# Patient Record
Sex: FEMALE | Race: BLACK OR AFRICAN AMERICAN | NOT HISPANIC OR LATINO | Employment: FULL TIME | URBAN - METROPOLITAN AREA
[De-identification: names, ages, dates, MRNs, and addresses within clinical notes are randomized per-mention and may not be internally consistent; named-entity substitution may affect disease eponyms.]

---

## 2024-06-11 ENCOUNTER — APPOINTMENT (OUTPATIENT)
Dept: RADIOLOGY | Facility: HOSPITAL | Age: 39
DRG: 417 | End: 2024-06-11
Payer: MEDICARE

## 2024-06-11 ENCOUNTER — APPOINTMENT (OUTPATIENT)
Dept: CARDIOLOGY | Facility: HOSPITAL | Age: 39
DRG: 417 | End: 2024-06-11
Payer: MEDICARE

## 2024-06-11 ENCOUNTER — HOSPITAL ENCOUNTER (INPATIENT)
Facility: HOSPITAL | Age: 39
LOS: 3 days | Discharge: HOME | End: 2024-06-14
Attending: EMERGENCY MEDICINE | Admitting: INTERNAL MEDICINE
Payer: MEDICARE

## 2024-06-11 DIAGNOSIS — K81.9 CHOLECYSTITIS: ICD-10-CM

## 2024-06-11 DIAGNOSIS — K85.90 ACUTE PANCREATITIS, UNSPECIFIED COMPLICATION STATUS, UNSPECIFIED PANCREATITIS TYPE (HHS-HCC): Primary | ICD-10-CM

## 2024-06-11 LAB
ALBUMIN SERPL BCP-MCNC: 4.7 G/DL (ref 3.4–5)
ALP SERPL-CCNC: 184 U/L (ref 33–110)
ALT SERPL W P-5'-P-CCNC: 74 U/L (ref 7–45)
ANION GAP SERPL CALC-SCNC: 30 MMOL/L (ref 10–20)
APTT PPP: 31 SECONDS (ref 27–38)
AST SERPL W P-5'-P-CCNC: 137 U/L (ref 9–39)
BASOPHILS # BLD AUTO: 0.04 X10*3/UL (ref 0–0.1)
BASOPHILS NFR BLD AUTO: 0.4 %
BILIRUB DIRECT SERPL-MCNC: 0.8 MG/DL (ref 0–0.3)
BILIRUB SERPL-MCNC: 2 MG/DL (ref 0–1.2)
BUN SERPL-MCNC: 10 MG/DL (ref 6–23)
CALCIUM SERPL-MCNC: 10.4 MG/DL (ref 8.6–10.3)
CARDIAC TROPONIN I PNL SERPL HS: 4 NG/L (ref 0–13)
CARDIAC TROPONIN I PNL SERPL HS: 4 NG/L (ref 0–13)
CHLORIDE SERPL-SCNC: 87 MMOL/L (ref 98–107)
CO2 SERPL-SCNC: 22 MMOL/L (ref 21–32)
CREAT SERPL-MCNC: 0.72 MG/DL (ref 0.5–1.05)
EGFRCR SERPLBLD CKD-EPI 2021: >90 ML/MIN/1.73M*2
EOSINOPHIL # BLD AUTO: 0 X10*3/UL (ref 0–0.7)
EOSINOPHIL NFR BLD AUTO: 0 %
ERYTHROCYTE [DISTWIDTH] IN BLOOD BY AUTOMATED COUNT: 13.2 % (ref 11.5–14.5)
GLUCOSE SERPL-MCNC: 116 MG/DL (ref 74–99)
HCT VFR BLD AUTO: 39.8 % (ref 36–46)
HGB BLD-MCNC: 13.6 G/DL (ref 12–16)
IMM GRANULOCYTES # BLD AUTO: 0.04 X10*3/UL (ref 0–0.7)
IMM GRANULOCYTES NFR BLD AUTO: 0.4 % (ref 0–0.9)
INR PPP: 1.1 (ref 0.9–1.1)
LIPASE SERPL-CCNC: 255 U/L (ref 9–82)
LYMPHOCYTES # BLD AUTO: 1.65 X10*3/UL (ref 1.2–4.8)
LYMPHOCYTES NFR BLD AUTO: 16 %
MCH RBC QN AUTO: 29.7 PG (ref 26–34)
MCHC RBC AUTO-ENTMCNC: 34.2 G/DL (ref 32–36)
MCV RBC AUTO: 87 FL (ref 80–100)
MONOCYTES # BLD AUTO: 0.48 X10*3/UL (ref 0.1–1)
MONOCYTES NFR BLD AUTO: 4.7 %
NEUTROPHILS # BLD AUTO: 8.09 X10*3/UL (ref 1.2–7.7)
NEUTROPHILS NFR BLD AUTO: 78.5 %
NRBC BLD-RTO: 0 /100 WBCS (ref 0–0)
PLATELET # BLD AUTO: 266 X10*3/UL (ref 150–450)
POTASSIUM SERPL-SCNC: 3.8 MMOL/L (ref 3.5–5.3)
PROT SERPL-MCNC: 9.3 G/DL (ref 6.4–8.2)
PROTHROMBIN TIME: 12.6 SECONDS (ref 9.8–12.8)
RBC # BLD AUTO: 4.58 X10*6/UL (ref 4–5.2)
SODIUM SERPL-SCNC: 135 MMOL/L (ref 136–145)
WBC # BLD AUTO: 10.3 X10*3/UL (ref 4.4–11.3)

## 2024-06-11 PROCEDURE — 70450 CT HEAD/BRAIN W/O DYE: CPT | Performed by: RADIOLOGY

## 2024-06-11 PROCEDURE — 36415 COLL VENOUS BLD VENIPUNCTURE: CPT | Performed by: EMERGENCY MEDICINE

## 2024-06-11 PROCEDURE — 80053 COMPREHEN METABOLIC PANEL: CPT | Performed by: EMERGENCY MEDICINE

## 2024-06-11 PROCEDURE — 83690 ASSAY OF LIPASE: CPT | Performed by: INTERNAL MEDICINE

## 2024-06-11 PROCEDURE — 99221 1ST HOSP IP/OBS SF/LOW 40: CPT

## 2024-06-11 PROCEDURE — 72125 CT NECK SPINE W/O DYE: CPT | Performed by: RADIOLOGY

## 2024-06-11 PROCEDURE — 71260 CT THORAX DX C+: CPT

## 2024-06-11 PROCEDURE — 96375 TX/PRO/DX INJ NEW DRUG ADDON: CPT

## 2024-06-11 PROCEDURE — 93005 ELECTROCARDIOGRAM TRACING: CPT

## 2024-06-11 PROCEDURE — 96365 THER/PROPH/DIAG IV INF INIT: CPT

## 2024-06-11 PROCEDURE — 2500000004 HC RX 250 GENERAL PHARMACY W/ HCPCS (ALT 636 FOR OP/ED): Performed by: INTERNAL MEDICINE

## 2024-06-11 PROCEDURE — 85610 PROTHROMBIN TIME: CPT | Performed by: EMERGENCY MEDICINE

## 2024-06-11 PROCEDURE — 2550000001 HC RX 255 CONTRASTS: Performed by: EMERGENCY MEDICINE

## 2024-06-11 PROCEDURE — 74177 CT ABD & PELVIS W/CONTRAST: CPT | Mod: FOREIGN READ | Performed by: RADIOLOGY

## 2024-06-11 PROCEDURE — 96376 TX/PRO/DX INJ SAME DRUG ADON: CPT

## 2024-06-11 PROCEDURE — 85025 COMPLETE CBC W/AUTO DIFF WBC: CPT | Performed by: EMERGENCY MEDICINE

## 2024-06-11 PROCEDURE — 84484 ASSAY OF TROPONIN QUANT: CPT | Performed by: EMERGENCY MEDICINE

## 2024-06-11 PROCEDURE — 85730 THROMBOPLASTIN TIME PARTIAL: CPT | Performed by: EMERGENCY MEDICINE

## 2024-06-11 PROCEDURE — 82248 BILIRUBIN DIRECT: CPT | Performed by: SURGERY

## 2024-06-11 PROCEDURE — 2500000004 HC RX 250 GENERAL PHARMACY W/ HCPCS (ALT 636 FOR OP/ED)

## 2024-06-11 PROCEDURE — 70450 CT HEAD/BRAIN W/O DYE: CPT

## 2024-06-11 PROCEDURE — 99222 1ST HOSP IP/OBS MODERATE 55: CPT | Performed by: INTERNAL MEDICINE

## 2024-06-11 PROCEDURE — 72125 CT NECK SPINE W/O DYE: CPT

## 2024-06-11 PROCEDURE — 1210000001 HC SEMI-PRIVATE ROOM DAILY

## 2024-06-11 PROCEDURE — 71260 CT THORAX DX C+: CPT | Mod: FOREIGN READ | Performed by: RADIOLOGY

## 2024-06-11 PROCEDURE — 83690 ASSAY OF LIPASE: CPT | Performed by: EMERGENCY MEDICINE

## 2024-06-11 PROCEDURE — 99285 EMERGENCY DEPT VISIT HI MDM: CPT | Mod: 25

## 2024-06-11 PROCEDURE — 2500000004 HC RX 250 GENERAL PHARMACY W/ HCPCS (ALT 636 FOR OP/ED): Performed by: EMERGENCY MEDICINE

## 2024-06-11 RX ORDER — LABETALOL HYDROCHLORIDE 5 MG/ML
20 INJECTION, SOLUTION INTRAVENOUS ONCE
Status: COMPLETED | OUTPATIENT
Start: 2024-06-11 | End: 2024-06-11

## 2024-06-11 RX ORDER — MORPHINE SULFATE 4 MG/ML
4 INJECTION, SOLUTION INTRAMUSCULAR; INTRAVENOUS EVERY 4 HOURS PRN
Status: DISCONTINUED | OUTPATIENT
Start: 2024-06-11 | End: 2024-06-12

## 2024-06-11 RX ORDER — ACETAMINOPHEN 325 MG/1
650 TABLET ORAL EVERY 4 HOURS PRN
Status: DISCONTINUED | OUTPATIENT
Start: 2024-06-11 | End: 2024-06-14 | Stop reason: HOSPADM

## 2024-06-11 RX ORDER — ONDANSETRON HYDROCHLORIDE 2 MG/ML
4 INJECTION, SOLUTION INTRAVENOUS ONCE
Status: COMPLETED | OUTPATIENT
Start: 2024-06-11 | End: 2024-06-11

## 2024-06-11 RX ORDER — ONDANSETRON 4 MG/1
4 TABLET, FILM COATED ORAL EVERY 8 HOURS PRN
Status: DISCONTINUED | OUTPATIENT
Start: 2024-06-11 | End: 2024-06-12

## 2024-06-11 RX ORDER — MORPHINE SULFATE 2 MG/ML
2 INJECTION, SOLUTION INTRAMUSCULAR; INTRAVENOUS EVERY 4 HOURS PRN
Status: DISCONTINUED | OUTPATIENT
Start: 2024-06-11 | End: 2024-06-12

## 2024-06-11 RX ORDER — ENOXAPARIN SODIUM 100 MG/ML
40 INJECTION SUBCUTANEOUS EVERY 24 HOURS
Status: DISCONTINUED | OUTPATIENT
Start: 2024-06-12 | End: 2024-06-14 | Stop reason: HOSPADM

## 2024-06-11 RX ORDER — ACETAMINOPHEN 160 MG/5ML
650 SOLUTION ORAL EVERY 4 HOURS PRN
Status: DISCONTINUED | OUTPATIENT
Start: 2024-06-11 | End: 2024-06-14 | Stop reason: HOSPADM

## 2024-06-11 RX ORDER — MORPHINE SULFATE 4 MG/ML
4 INJECTION, SOLUTION INTRAMUSCULAR; INTRAVENOUS ONCE
Status: COMPLETED | OUTPATIENT
Start: 2024-06-11 | End: 2024-06-11

## 2024-06-11 RX ORDER — PANTOPRAZOLE SODIUM 40 MG/10ML
40 INJECTION, POWDER, LYOPHILIZED, FOR SOLUTION INTRAVENOUS
Status: DISCONTINUED | OUTPATIENT
Start: 2024-06-12 | End: 2024-06-14 | Stop reason: HOSPADM

## 2024-06-11 RX ORDER — PANTOPRAZOLE SODIUM 40 MG/1
40 TABLET, DELAYED RELEASE ORAL
Status: DISCONTINUED | OUTPATIENT
Start: 2024-06-12 | End: 2024-06-14 | Stop reason: HOSPADM

## 2024-06-11 RX ORDER — ONDANSETRON HYDROCHLORIDE 2 MG/ML
4 INJECTION, SOLUTION INTRAVENOUS EVERY 8 HOURS PRN
Status: DISCONTINUED | OUTPATIENT
Start: 2024-06-11 | End: 2024-06-14 | Stop reason: HOSPADM

## 2024-06-11 RX ORDER — ACETAMINOPHEN 650 MG/1
650 SUPPOSITORY RECTAL EVERY 4 HOURS PRN
Status: DISCONTINUED | OUTPATIENT
Start: 2024-06-11 | End: 2024-06-12

## 2024-06-11 RX ADMIN — ONDANSETRON 4 MG: 2 INJECTION INTRAMUSCULAR; INTRAVENOUS at 21:44

## 2024-06-11 RX ADMIN — LABETALOL HYDROCHLORIDE 20 MG: 5 INJECTION, SOLUTION INTRAVENOUS at 22:43

## 2024-06-11 RX ADMIN — PIPERACILLIN SODIUM AND TAZOBACTAM SODIUM 3.38 G: 3; .375 INJECTION, SOLUTION INTRAVENOUS at 21:24

## 2024-06-11 RX ADMIN — IOHEXOL 75 ML: 350 INJECTION, SOLUTION INTRAVENOUS at 17:21

## 2024-06-11 RX ADMIN — MORPHINE SULFATE 4 MG: 4 INJECTION, SOLUTION INTRAMUSCULAR; INTRAVENOUS at 14:49

## 2024-06-11 RX ADMIN — ONDANSETRON 4 MG: 2 INJECTION INTRAMUSCULAR; INTRAVENOUS at 14:50

## 2024-06-11 RX ADMIN — SODIUM CHLORIDE 1000 ML: 9 INJECTION, SOLUTION INTRAVENOUS at 21:23

## 2024-06-11 RX ADMIN — MORPHINE SULFATE 4 MG: 4 INJECTION, SOLUTION INTRAMUSCULAR; INTRAVENOUS at 21:24

## 2024-06-11 ASSESSMENT — PAIN DESCRIPTION - ORIENTATION: ORIENTATION: RIGHT

## 2024-06-11 ASSESSMENT — ENCOUNTER SYMPTOMS
ABDOMINAL PAIN: 1
APPETITE CHANGE: 1
BACK PAIN: 1
SHORTNESS OF BREATH: 1
NAUSEA: 1
VOMITING: 1

## 2024-06-11 ASSESSMENT — PAIN DESCRIPTION - LOCATION
LOCATION: BACK
LOCATION: BACK

## 2024-06-11 ASSESSMENT — PAIN - FUNCTIONAL ASSESSMENT
PAIN_FUNCTIONAL_ASSESSMENT: 0-10

## 2024-06-11 ASSESSMENT — COLUMBIA-SUICIDE SEVERITY RATING SCALE - C-SSRS
6. HAVE YOU EVER DONE ANYTHING, STARTED TO DO ANYTHING, OR PREPARED TO DO ANYTHING TO END YOUR LIFE?: NO
2. HAVE YOU ACTUALLY HAD ANY THOUGHTS OF KILLING YOURSELF?: NO
1. IN THE PAST MONTH, HAVE YOU WISHED YOU WERE DEAD OR WISHED YOU COULD GO TO SLEEP AND NOT WAKE UP?: NO

## 2024-06-11 ASSESSMENT — PAIN SCALES - GENERAL
PAINLEVEL_OUTOF10: 5 - MODERATE PAIN
PAINLEVEL_OUTOF10: 10 - WORST POSSIBLE PAIN
PAINLEVEL_OUTOF10: 10 - WORST POSSIBLE PAIN
PAINLEVEL_OUTOF10: 9

## 2024-06-11 ASSESSMENT — PAIN DESCRIPTION - PAIN TYPE: TYPE: ACUTE PAIN
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
## 2024-06-12 ENCOUNTER — ANESTHESIA (OUTPATIENT)
Dept: OPERATING ROOM | Facility: HOSPITAL | Age: 39
End: 2024-06-12
Payer: MEDICARE

## 2024-06-12 ENCOUNTER — ANESTHESIA EVENT (OUTPATIENT)
Dept: OPERATING ROOM | Facility: HOSPITAL | Age: 39
End: 2024-06-12
Payer: MEDICARE

## 2024-06-12 ENCOUNTER — APPOINTMENT (OUTPATIENT)
Dept: RADIOLOGY | Facility: HOSPITAL | Age: 39
DRG: 417 | End: 2024-06-12
Payer: MEDICARE

## 2024-06-12 PROBLEM — J42 CHRONIC BRONCHITIS (MULTI): Status: ACTIVE | Noted: 2024-06-12

## 2024-06-12 PROBLEM — K81.9 CHOLECYSTITIS: Status: ACTIVE | Noted: 2024-06-11

## 2024-06-12 PROBLEM — I10 HTN (HYPERTENSION): Status: ACTIVE | Noted: 2024-06-12

## 2024-06-12 PROBLEM — K76.0 FATTY LIVER: Status: ACTIVE | Noted: 2024-06-12

## 2024-06-12 LAB
ALBUMIN SERPL BCP-MCNC: 4 G/DL (ref 3.4–5)
ALP SERPL-CCNC: 148 U/L (ref 33–110)
ALT SERPL W P-5'-P-CCNC: 53 U/L (ref 7–45)
ANION GAP SERPL CALC-SCNC: 19 MMOL/L (ref 10–20)
AST SERPL W P-5'-P-CCNC: 96 U/L (ref 9–39)
B-HCG SERPL-ACNC: <2 MIU/ML
BILIRUB DIRECT SERPL-MCNC: 0.7 MG/DL (ref 0–0.3)
BILIRUB SERPL-MCNC: 1.5 MG/DL (ref 0–1.2)
BUN SERPL-MCNC: 8 MG/DL (ref 6–23)
CALCIUM SERPL-MCNC: 9 MG/DL (ref 8.6–10.3)
CHLORIDE SERPL-SCNC: 89 MMOL/L (ref 98–107)
CO2 SERPL-SCNC: 28 MMOL/L (ref 21–32)
CREAT SERPL-MCNC: 0.62 MG/DL (ref 0.5–1.05)
EGFRCR SERPLBLD CKD-EPI 2021: >90 ML/MIN/1.73M*2
ERYTHROCYTE [DISTWIDTH] IN BLOOD BY AUTOMATED COUNT: 13.2 % (ref 11.5–14.5)
GLUCOSE SERPL-MCNC: 103 MG/DL (ref 74–99)
HCT VFR BLD AUTO: 33.3 % (ref 36–46)
HGB BLD-MCNC: 11.2 G/DL (ref 12–16)
LIPASE SERPL-CCNC: 180 U/L (ref 9–82)
MCH RBC QN AUTO: 29 PG (ref 26–34)
MCHC RBC AUTO-ENTMCNC: 33.6 G/DL (ref 32–36)
MCV RBC AUTO: 86 FL (ref 80–100)
NRBC BLD-RTO: 0 /100 WBCS (ref 0–0)
PLATELET # BLD AUTO: 207 X10*3/UL (ref 150–450)
POTASSIUM SERPL-SCNC: 2.8 MMOL/L (ref 3.5–5.3)
PROT SERPL-MCNC: 7.4 G/DL (ref 6.4–8.2)
RBC # BLD AUTO: 3.86 X10*6/UL (ref 4–5.2)
SODIUM SERPL-SCNC: 133 MMOL/L (ref 136–145)
WBC # BLD AUTO: 7.5 X10*3/UL (ref 4.4–11.3)

## 2024-06-12 PROCEDURE — 3700000001 HC GENERAL ANESTHESIA TIME - INITIAL BASE CHARGE: Performed by: SURGERY

## 2024-06-12 PROCEDURE — 2500000004 HC RX 250 GENERAL PHARMACY W/ HCPCS (ALT 636 FOR OP/ED): Performed by: INTERNAL MEDICINE

## 2024-06-12 PROCEDURE — 99233 SBSQ HOSP IP/OBS HIGH 50: CPT | Performed by: INTERNAL MEDICINE

## 2024-06-12 PROCEDURE — 2720000007 HC OR 272 NO HCPCS: Performed by: SURGERY

## 2024-06-12 PROCEDURE — BF532Z0 OTHER IMAGING OF GALLBLADDER AND BILE DUCTS USING FLUORESCING AGENT, INTRAOPERATIVE: ICD-10-PCS | Performed by: SURGERY

## 2024-06-12 PROCEDURE — 2500000004 HC RX 250 GENERAL PHARMACY W/ HCPCS (ALT 636 FOR OP/ED): Performed by: REGISTERED NURSE

## 2024-06-12 PROCEDURE — 99231 SBSQ HOSP IP/OBS SF/LOW 25: CPT

## 2024-06-12 PROCEDURE — 7100000001 HC RECOVERY ROOM TIME - INITIAL BASE CHARGE: Performed by: SURGERY

## 2024-06-12 PROCEDURE — 2500000004 HC RX 250 GENERAL PHARMACY W/ HCPCS (ALT 636 FOR OP/ED): Performed by: SURGERY

## 2024-06-12 PROCEDURE — 88304 TISSUE EXAM BY PATHOLOGIST: CPT | Performed by: STUDENT IN AN ORGANIZED HEALTH CARE EDUCATION/TRAINING PROGRAM

## 2024-06-12 PROCEDURE — 3700000002 HC GENERAL ANESTHESIA TIME - EACH INCREMENTAL 1 MINUTE: Performed by: SURGERY

## 2024-06-12 PROCEDURE — 2500000005 HC RX 250 GENERAL PHARMACY W/O HCPCS: Performed by: REGISTERED NURSE

## 2024-06-12 PROCEDURE — 80053 COMPREHEN METABOLIC PANEL: CPT | Performed by: INTERNAL MEDICINE

## 2024-06-12 PROCEDURE — 99222 1ST HOSP IP/OBS MODERATE 55: CPT | Performed by: INTERNAL MEDICINE

## 2024-06-12 PROCEDURE — 74300 X-RAY BILE DUCTS/PANCREAS: CPT

## 2024-06-12 PROCEDURE — 84702 CHORIONIC GONADOTROPIN TEST: CPT | Performed by: SURGERY

## 2024-06-12 PROCEDURE — 88304 TISSUE EXAM BY PATHOLOGIST: CPT | Mod: TC,AHULAB | Performed by: SURGERY

## 2024-06-12 PROCEDURE — 2500000004 HC RX 250 GENERAL PHARMACY W/ HCPCS (ALT 636 FOR OP/ED): Performed by: STUDENT IN AN ORGANIZED HEALTH CARE EDUCATION/TRAINING PROGRAM

## 2024-06-12 PROCEDURE — 99232 SBSQ HOSP IP/OBS MODERATE 35: CPT | Performed by: SURGERY

## 2024-06-12 PROCEDURE — 76705 ECHO EXAM OF ABDOMEN: CPT

## 2024-06-12 PROCEDURE — 2780000003 HC OR 278 NO HCPCS: Performed by: SURGERY

## 2024-06-12 PROCEDURE — 7100000002 HC RECOVERY ROOM TIME - EACH INCREMENTAL 1 MINUTE: Performed by: SURGERY

## 2024-06-12 PROCEDURE — 3600000003 HC OR TIME - INITIAL BASE CHARGE - PROCEDURE LEVEL THREE: Performed by: SURGERY

## 2024-06-12 PROCEDURE — 47563 LAPARO CHOLECYSTECTOMY/GRAPH: CPT | Performed by: SURGERY

## 2024-06-12 PROCEDURE — 76705 ECHO EXAM OF ABDOMEN: CPT | Performed by: RADIOLOGY

## 2024-06-12 PROCEDURE — 0FT44ZZ RESECTION OF GALLBLADDER, PERCUTANEOUS ENDOSCOPIC APPROACH: ICD-10-PCS | Performed by: SURGERY

## 2024-06-12 PROCEDURE — 84075 ASSAY ALKALINE PHOSPHATASE: CPT | Performed by: NURSE PRACTITIONER

## 2024-06-12 PROCEDURE — 1100000001 HC PRIVATE ROOM DAILY

## 2024-06-12 PROCEDURE — 36415 COLL VENOUS BLD VENIPUNCTURE: CPT | Performed by: INTERNAL MEDICINE

## 2024-06-12 PROCEDURE — 85027 COMPLETE CBC AUTOMATED: CPT | Performed by: INTERNAL MEDICINE

## 2024-06-12 PROCEDURE — 3600000008 HC OR TIME - EACH INCREMENTAL 1 MINUTE - PROCEDURE LEVEL THREE: Performed by: SURGERY

## 2024-06-12 RX ORDER — OXYCODONE HYDROCHLORIDE 5 MG/1
5 TABLET ORAL EVERY 4 HOURS PRN
Status: DISCONTINUED | OUTPATIENT
Start: 2024-06-12 | End: 2024-06-12 | Stop reason: HOSPADM

## 2024-06-12 RX ORDER — MIDAZOLAM HYDROCHLORIDE 1 MG/ML
INJECTION INTRAMUSCULAR; INTRAVENOUS AS NEEDED
Status: DISCONTINUED | OUTPATIENT
Start: 2024-06-12 | End: 2024-06-12

## 2024-06-12 RX ORDER — DEXMEDETOMIDINE IN 0.9 % NACL 20 MCG/5ML
SYRINGE (ML) INTRAVENOUS AS NEEDED
Status: DISCONTINUED | OUTPATIENT
Start: 2024-06-12 | End: 2024-06-12

## 2024-06-12 RX ORDER — ONDANSETRON HYDROCHLORIDE 2 MG/ML
4 INJECTION, SOLUTION INTRAVENOUS ONCE AS NEEDED
Status: DISCONTINUED | OUTPATIENT
Start: 2024-06-12 | End: 2024-06-12 | Stop reason: HOSPADM

## 2024-06-12 RX ORDER — FENTANYL CITRATE 50 UG/ML
INJECTION, SOLUTION INTRAMUSCULAR; INTRAVENOUS AS NEEDED
Status: DISCONTINUED | OUTPATIENT
Start: 2024-06-12 | End: 2024-06-12

## 2024-06-12 RX ORDER — DIPHENHYDRAMINE HYDROCHLORIDE 50 MG/ML
12.5 INJECTION INTRAMUSCULAR; INTRAVENOUS ONCE AS NEEDED
Status: DISCONTINUED | OUTPATIENT
Start: 2024-06-12 | End: 2024-06-12 | Stop reason: HOSPADM

## 2024-06-12 RX ORDER — ONDANSETRON HYDROCHLORIDE 2 MG/ML
INJECTION, SOLUTION INTRAVENOUS AS NEEDED
Status: DISCONTINUED | OUTPATIENT
Start: 2024-06-12 | End: 2024-06-12

## 2024-06-12 RX ORDER — CEFAZOLIN 1 G/1
INJECTION, POWDER, FOR SOLUTION INTRAVENOUS AS NEEDED
Status: DISCONTINUED | OUTPATIENT
Start: 2024-06-12 | End: 2024-06-12

## 2024-06-12 RX ORDER — KETOROLAC TROMETHAMINE 30 MG/ML
15 INJECTION, SOLUTION INTRAMUSCULAR; INTRAVENOUS EVERY 6 HOURS
Status: DISPENSED | OUTPATIENT
Start: 2024-06-12 | End: 2024-06-14

## 2024-06-12 RX ORDER — LIDOCAINE HYDROCHLORIDE 10 MG/ML
0.1 INJECTION, SOLUTION EPIDURAL; INFILTRATION; INTRACAUDAL; PERINEURAL ONCE
Status: DISCONTINUED | OUTPATIENT
Start: 2024-06-12 | End: 2024-06-12 | Stop reason: HOSPADM

## 2024-06-12 RX ORDER — SODIUM CHLORIDE, SODIUM LACTATE, POTASSIUM CHLORIDE, CALCIUM CHLORIDE 600; 310; 30; 20 MG/100ML; MG/100ML; MG/100ML; MG/100ML
INJECTION, SOLUTION INTRAVENOUS CONTINUOUS PRN
Status: DISCONTINUED | OUTPATIENT
Start: 2024-06-12 | End: 2024-06-12

## 2024-06-12 RX ORDER — PROPOFOL 10 MG/ML
INJECTION, EMULSION INTRAVENOUS AS NEEDED
Status: DISCONTINUED | OUTPATIENT
Start: 2024-06-12 | End: 2024-06-12

## 2024-06-12 RX ORDER — LIDOCAINE HYDROCHLORIDE 20 MG/ML
INJECTION, SOLUTION EPIDURAL; INFILTRATION; INTRACAUDAL; PERINEURAL AS NEEDED
Status: DISCONTINUED | OUTPATIENT
Start: 2024-06-12 | End: 2024-06-12

## 2024-06-12 RX ORDER — POLYETHYLENE GLYCOL 3350 17 G/17G
17 POWDER, FOR SOLUTION ORAL DAILY
Status: DISCONTINUED | OUTPATIENT
Start: 2024-06-12 | End: 2024-06-14 | Stop reason: HOSPADM

## 2024-06-12 RX ORDER — LABETALOL HYDROCHLORIDE 5 MG/ML
5 INJECTION, SOLUTION INTRAVENOUS ONCE AS NEEDED
Status: DISCONTINUED | OUTPATIENT
Start: 2024-06-12 | End: 2024-06-12 | Stop reason: HOSPADM

## 2024-06-12 RX ORDER — BUPIVACAINE HYDROCHLORIDE 5 MG/ML
INJECTION, SOLUTION PERINEURAL AS NEEDED
Status: DISCONTINUED | OUTPATIENT
Start: 2024-06-12 | End: 2024-06-12 | Stop reason: HOSPADM

## 2024-06-12 RX ORDER — POTASSIUM CHLORIDE 14.9 MG/ML
20 INJECTION INTRAVENOUS
Status: COMPLETED | OUTPATIENT
Start: 2024-06-12 | End: 2024-06-12

## 2024-06-12 RX ORDER — KETOROLAC TROMETHAMINE 30 MG/ML
INJECTION, SOLUTION INTRAMUSCULAR; INTRAVENOUS AS NEEDED
Status: DISCONTINUED | OUTPATIENT
Start: 2024-06-12 | End: 2024-06-12

## 2024-06-12 RX ORDER — OXYCODONE AND ACETAMINOPHEN 5; 325 MG/1; MG/1
1 TABLET ORAL EVERY 4 HOURS PRN
Status: DISCONTINUED | OUTPATIENT
Start: 2024-06-12 | End: 2024-06-14 | Stop reason: HOSPADM

## 2024-06-12 RX ORDER — ROSUVASTATIN CALCIUM 20 MG/1
20 TABLET, COATED ORAL
COMMUNITY
Start: 2024-04-05

## 2024-06-12 RX ORDER — SODIUM CHLORIDE, SODIUM LACTATE, POTASSIUM CHLORIDE, CALCIUM CHLORIDE 600; 310; 30; 20 MG/100ML; MG/100ML; MG/100ML; MG/100ML
100 INJECTION, SOLUTION INTRAVENOUS CONTINUOUS
Status: DISCONTINUED | OUTPATIENT
Start: 2024-06-12 | End: 2024-06-12 | Stop reason: HOSPADM

## 2024-06-12 RX ORDER — HYDROMORPHONE HYDROCHLORIDE 0.2 MG/ML
0.2 INJECTION INTRAMUSCULAR; INTRAVENOUS; SUBCUTANEOUS EVERY 4 HOURS PRN
Status: DISCONTINUED | OUTPATIENT
Start: 2024-06-12 | End: 2024-06-12

## 2024-06-12 RX ORDER — METOPROLOL TARTRATE 1 MG/ML
INJECTION, SOLUTION INTRAVENOUS AS NEEDED
Status: DISCONTINUED | OUTPATIENT
Start: 2024-06-12 | End: 2024-06-12

## 2024-06-12 RX ORDER — ROCURONIUM BROMIDE 10 MG/ML
INJECTION, SOLUTION INTRAVENOUS AS NEEDED
Status: DISCONTINUED | OUTPATIENT
Start: 2024-06-12 | End: 2024-06-12

## 2024-06-12 RX ORDER — ESMOLOL HYDROCHLORIDE 10 MG/ML
INJECTION INTRAVENOUS AS NEEDED
Status: DISCONTINUED | OUTPATIENT
Start: 2024-06-12 | End: 2024-06-12

## 2024-06-12 RX ADMIN — POTASSIUM CHLORIDE 20 MEQ: 14.9 INJECTION, SOLUTION INTRAVENOUS at 10:17

## 2024-06-12 RX ADMIN — HYDROMORPHONE HYDROCHLORIDE 0.5 MG: 1 INJECTION, SOLUTION INTRAMUSCULAR; INTRAVENOUS; SUBCUTANEOUS at 17:49

## 2024-06-12 RX ADMIN — HYDROMORPHONE HYDROCHLORIDE 0.5 MG: 1 INJECTION, SOLUTION INTRAMUSCULAR; INTRAVENOUS; SUBCUTANEOUS at 18:08

## 2024-06-12 RX ADMIN — HYDROMORPHONE HYDROCHLORIDE 0.4 MG: 1 INJECTION, SOLUTION INTRAMUSCULAR; INTRAVENOUS; SUBCUTANEOUS at 08:04

## 2024-06-12 RX ADMIN — HYDROMORPHONE HYDROCHLORIDE 0.5 MG: 1 INJECTION, SOLUTION INTRAMUSCULAR; INTRAVENOUS; SUBCUTANEOUS at 17:59

## 2024-06-12 RX ADMIN — HYDROMORPHONE HYDROCHLORIDE 0.5 MG: 1 INJECTION, SOLUTION INTRAMUSCULAR; INTRAVENOUS; SUBCUTANEOUS at 17:38

## 2024-06-12 RX ADMIN — ONDANSETRON 4 MG: 2 INJECTION INTRAMUSCULAR; INTRAVENOUS at 08:14

## 2024-06-12 RX ADMIN — ONDANSETRON 4 MG: 2 INJECTION INTRAMUSCULAR; INTRAVENOUS at 20:26

## 2024-06-12 RX ADMIN — HYDROMORPHONE HYDROCHLORIDE 0.4 MG: 1 INJECTION, SOLUTION INTRAMUSCULAR; INTRAVENOUS; SUBCUTANEOUS at 12:27

## 2024-06-12 RX ADMIN — HYDROMORPHONE HYDROCHLORIDE 0.4 MG: 1 INJECTION, SOLUTION INTRAMUSCULAR; INTRAVENOUS; SUBCUTANEOUS at 21:02

## 2024-06-12 RX ADMIN — ENOXAPARIN SODIUM 40 MG: 40 INJECTION SUBCUTANEOUS at 08:03

## 2024-06-12 RX ADMIN — POTASSIUM CHLORIDE 20 MEQ: 14.9 INJECTION, SOLUTION INTRAVENOUS at 11:15

## 2024-06-12 SDOH — HEALTH STABILITY: MENTAL HEALTH: CURRENT SMOKER: 0

## 2024-06-12 ASSESSMENT — PAIN SCALES - GENERAL
PAINLEVEL_OUTOF10: 6
PAINLEVEL_OUTOF10: 6
PAINLEVEL_OUTOF10: 10 - WORST POSSIBLE PAIN
PAINLEVEL_OUTOF10: 9
PAINLEVEL_OUTOF10: 5 - MODERATE PAIN
PAINLEVEL_OUTOF10: 9
PAINLEVEL_OUTOF10: 10 - WORST POSSIBLE PAIN
PAINLEVEL_OUTOF10: 3
PAINLEVEL_OUTOF10: 3
PAINLEVEL_OUTOF10: 4
PAINLEVEL_OUTOF10: 7
PAINLEVEL_OUTOF10: 4

## 2024-06-12 ASSESSMENT — COGNITIVE AND FUNCTIONAL STATUS - GENERAL
DAILY ACTIVITIY SCORE: 24
DAILY ACTIVITIY SCORE: 24
MOBILITY SCORE: 24
DAILY ACTIVITIY SCORE: 24
MOBILITY SCORE: 24
MOBILITY SCORE: 24

## 2024-06-12 ASSESSMENT — PAIN DESCRIPTION - LOCATION
LOCATION: ABDOMEN
LOCATION: ABDOMEN

## 2024-06-12 ASSESSMENT — ACTIVITIES OF DAILY LIVING (ADL): LACK_OF_TRANSPORTATION: NO

## 2024-06-12 ASSESSMENT — PAIN DESCRIPTION - ORIENTATION: ORIENTATION: RIGHT

## 2024-06-12 ASSESSMENT — PAIN SCALES - PAIN ASSESSMENT IN ADVANCED DEMENTIA (PAINAD): TOTALSCORE: MEDICATION (SEE MAR);HEAT APPLIED;COLD APPLIED

## 2024-06-12 ASSESSMENT — PAIN SCALES - WONG BAKER: WONGBAKER_NUMERICALRESPONSE: HURTS WORST

## 2024-06-12 NOTE — CARE PLAN
Problem: Pain  Goal: Takes deep breaths with improved pain control throughout the shift  Outcome: Progressing  Goal: Performs ADL's with improved pain control throughout shift  Outcome: Progressing  Goal: Participates in PT with improved pain control throughout the shift  Outcome: Progressing  Goal: Free from opioid side effects throughout the shift  Outcome: Progressing
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
## 2024-06-12 NOTE — OP NOTE
Cholecystectomy Laparoscopy Operative Note     Date: 2024 - 2024  OR Location: [facility] A OR    Name: Deloris Mcfadden, : 1985, Age: 39 y.o., MRN: [id number], Sex: female    Diagnosis  Pre-op Diagnosis     * Cholecystitis [K81.9] Post-op Diagnosis     * Cholecystitis [K81.9].  Pancreatitis     Procedures  Laparoscopic cholecystectomy and intraoperative cholangiogram    Surgeons      * Reginald Harden - Primary    Resident/Fellow/Other Assistant:  Surgeons and Role:  * No surgeons found with a matching role *    Procedure Summary  Anesthesia: General  ASA: III  Anesthesia Staff: Anesthesiologist: Martin Cedillo MD  CRNA: MARIAH Farias-CRNA, DNAP  C-AA: ISABEL Lino  Estimated Blood Loss: 5mL  Intra-op Medications:   Administrations occurring from 1500 to 1610 on 24:   Medication Name Total Dose   enoxaparin (Lovenox) syringe 40 mg Cannot be calculated   HYDROmorphone (Dilaudid) injection 0.4 mg Cannot be calculated   HYDROmorphone PF (Dilaudid) injection 0.2 mg Cannot be calculated   morphine injection 2 mg Cannot be calculated   morphine injection 4 mg Cannot be calculated              Anesthesia Record               Intraprocedure I/O Totals          Intake    LR infusion 1000.00 mL    Total Intake 1000 mL       Output    Urine 0 mL    Est. Blood Loss 10 mL    Total Output 10 mL       Net    Net Volume 990 mL          Specimen:   ID Type Source Tests Collected by Time   1 : GALLBLADDER Tissue GALLBLADDER CHOLECYSTECTOMY SURGICAL PATHOLOGY EXAM Reginald Harden MD 2024 1610        Staff:   Circulator: Elizabet Blood Person: Ramin Moeub Person: Delisa         Drains and/or Catheters:   [REMOVED] NG/OG/Feeding Tube OG - [place] sump 16 Fr Left mouth (Removed)   Tube Status Low intermittent suction 24 1555   Placement Verification Gastric contents 24 1555   Gastric Aspirate Other (Comment) 24 155   Site Assessment Clean;Dry;Intact 24    Drainage Appearance Bile 06/12/24 1555       Tourniquet Times:         Implants:     Findings: Cholangiogram appeared normal.  Massively distended gallbladder under some tension    Indications: Deloris Mcfadden is an 39 y.o. female who is having surgery for Cholecystitis [K81.9].  Diagnosed with likely biliary pancreatitis..    The patient was seen in the preoperative area. The risks, benefits, complications, treatment options, non-operative alternatives, expected recovery and outcomes were discussed with the patient. The possibilities of reaction to medication, pulmonary aspiration, injury to surrounding structures, bleeding, recurrent infection, the need for additional procedures, failure to diagnose a condition, and creating a complication requiring transfusion or operation were discussed with the patient. The patient concurred with the proposed plan, giving informed consent.  The site of surgery was properly noted/marked if necessary per policy. The patient has been actively warmed in preoperative area. Preoperative antibiotics have been ordered and given within 1 hours of incision. Venous thrombosis prophylaxis have been ordered including bilateral sequential compression devices    Procedure Details:  Description:  Patient was brought to the operating room placed supine on the table.  Timeout was performed which confirmed patient and procedure.  Perioperative antibiotics were administered.  Gen. anesthesia was administered through an endotracheal tube.  The abdomen was prepped and draped sterilely.    I injected half percent Marcaine and then made a sharp infraumbilical incision with the knife.  Sharp incision was made in the fascia.  The peritoneal cavity was entered under direct visualization and a Joleen port was placed.  The abdomen was insufflated and the patient was placed in steep reverse Trendelenburg.  A 5 mm 30° camera was introduced.  I placed a right upper quadrant 5 mm port and another 5 mL port  in the midline subxiphoid area.  The gallbladder was visualized.  It was noted to be massively distended and very low-lying in the abdomen.  It was then grasped and retracted superiorly into the right.  Meticulous dissection was then performed in the area of Calot triangle.  Critical view was achieved.  Posterior cystic plate visualized.  The cystic artery and cystic duct were skeletonized.  The artery was divided between hemoclips.  Endo Clip placed on the gallbladder side and then made a ductotomy with EndoShears.  Ranfac cholangiocatheter was inserted through a separate angiocatheter sheath.  Cholangiogram was obtained using C-arm fluoroscopy.  The anatomy was noted to be normal.  No obvious filling defects seen. Ranfac catheter removed.  I placed 3 clips on the distal cystic duct and one toward the gallbladder and divided with EndoShears.  The gallbladder was then dissected atraumatically out of the liver bed with hook cautery.  Once it was liberated it was placed in the Endo Catch bag and brought out through the umbilicus.  Liver bed was inspected and noted to be hemostatic.  Clips were noted to be in good position.  Gentle irrigation was performed.  The effluent was noted to be clear.  The ports were removed under direct visualization.  The abdomen was desufflated.  The fascia at the umbilicus was closed with 0 Vicryl.  Wounds were irrigated.  Skin incisions were closed with 4-0 Biosyn and Dermabond.  Patient was ultimately extubated and transferred to the recovery room in satisfactory condition.      Complications:  None; patient tolerated the procedure well.    Disposition: PACU - hemodynamically stable.  Condition: stable         Additional Details:     Attending Attestation: I was present for the entire procedure.    Reginald Harden  Phone Number: [phone]

## 2024-06-12 NOTE — NURSING NOTE
Upon arrival at the patients bedside the patient was ambulating off of the gurney with a steady gait, neurologically intact, denies pain, tolerated PO intake, 1 bout of emesis, tenderness at the abdomen, last bm 6/10, no noted distress rounding ensued care transferred without incidence. No sob, no hematuria no hematochezia care transferred withy the patient requesting breakfast.

## 2024-06-12 NOTE — PROGRESS NOTES
Pharmacy Medication History Review    Delrois Mcfadden is a 39 y.o. female admitted for Acute pancreatitis, unspecified complication status, unspecified pancreatitis type ([facility] Pharmacy reviewed the patient's [id number] medications and allergies for accuracy.    The list below reflectives the updated PTA list. Please review each medication in order reconciliation for additional clarification and justification.  Prior to Admission Medications   Prescriptions Last Dose Informant     rosuvastatin (Crestor) 20 mg tablet 6/10/2024      Sig: Take 1 tablet (20 mg) by mouth once daily.      Facility-Administered Medications: None      Allergies  Reviewed by Altagracia Hayes RN on 6/12/2024   No Known Allergies         Below are additional concerns with the patient's PTA list.  Patient verified all medications and doses.    Leena Saldaña
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
## 2024-06-12 NOTE — ANESTHESIA PREPROCEDURE EVALUATION
Patient: Deloris Mcfadden    Procedure Information       Date/Time: 06/12/24 1500    Procedure: Cholecystectomy Laparoscopy    [facility]    Surgeons: Reginald Harden MD            Relevant Problems   Anesthesia (within normal limits)      Cardiac   (+) HTN (hypertension) (On no meds)      Pulmonary   (+) Chronic bronchitis (Multi)      Liver   (+) Acute pancreatitis, unspecified complication status, unspecified pancreatitis type (HHS-HCC)   (+) Cholecystitis   (+) Fatty liver       Clinical information reviewed:   Tobacco  Allergies  Meds  Problems  Med Hx  Surg Hx   Fam Hx  Soc   Hx        NPO Detail:  NPO/Void Status  Date of Last Liquid: 06/11/24  Date of Last Solid: 06/11/24         Physical Exam    Airway  Mallampati: I  TM distance: >3 FB  Neck ROM: full     Cardiovascular    Dental    Pulmonary    Abdominal            Anesthesia Plan    History of general anesthesia?: yes  History of complications of general anesthesia?: no    ASA 3 - emergent     general     The patient is not a current smoker.    intravenous induction   Anesthetic plan and risks discussed with patient.    Plan discussed with CRNA.
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
## 2024-06-12 NOTE — ANESTHESIA PROCEDURE NOTES
Airway  Date/Time: 6/12/2024 3:52 PM  Urgency: elective    Airway not difficult    Staffing  Performed: CRNA   Authorized by: Martin Cedillo MD    Performed by: MARIAH Farias-CRNA, DNAP  Patient location during procedure: OR    Indications and Patient Condition  Indications for airway management: anesthesia  Spontaneous ventilation: present  Sedation level: deep  Preoxygenated: yes  Patient position: sniffing  Mask difficulty assessment: 1 - vent by mask    Final Airway Details  Final airway type: endotracheal airway      Successful airway: ETT  Cuffed: yes   Successful intubation technique: direct laryngoscopy  Facilitating devices/methods: intubating stylet  Endotracheal tube insertion site: oral  Blade: Arellano  Blade size: #2  ETT size (mm): 7.5  Cormack-Lehane Classification: grade IIa - partial view of glottis  Placement verified by: chest auscultation and capnometry   Cuff volume (mL): 5  Measured from: lips  ETT to lips (cm): 21  Number of attempts at approach: 1  Number of other approaches attempted: 0    Additional Comments  Easy, atraumatic, on first attempt without difficulty or evidence of aspiration noted.  Dentition unchanged, no complications noted.
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
## 2024-06-12 NOTE — ANESTHESIA POSTPROCEDURE EVALUATION
Patient: Deloris Mcfadden    Procedure Summary       Date: 06/12/24 Room / Location: [facility] OR 06 / Virtual [facility] OR    Anesthesia Start: 1541 Anesthesia Stop: 1707    Procedure: Cholecystectomy Laparoscopy Diagnosis:       Cholecystitis      (Cholecystitis [K81.9])    Surgeons: Reginald Harden MD Responsible Provider: Martin Cedillo MD    Anesthesia Type: general ASA Status: 3 - Emergent            Anesthesia Type: general    Vitals Value Taken Time   /93 06/12/24 1730   Temp 36.8 °C (98.2 °F) 06/12/24 1702   Pulse 76 06/12/24 1730   Resp 18 06/12/24 1730   SpO2 100 % 06/12/24 1730       Anesthesia Post Evaluation    Patient location during evaluation: bedside  Patient participation: complete - patient participated  Level of consciousness: awake  Pain management: adequate  Multimodal analgesia pain management approach  Airway patency: patent  Cardiovascular status: stable  Respiratory status: spontaneous ventilation and unassisted  Hydration status: acceptable  Postoperative Nausea and Vomiting: none  Comments: No significant PONV.        There were no known notable events for this encounter.

## 2024-06-13 ENCOUNTER — APPOINTMENT (OUTPATIENT)
Dept: CARDIOLOGY | Facility: HOSPITAL | Age: 39
DRG: 417 | End: 2024-06-13
Payer: MEDICARE

## 2024-06-13 LAB
ALBUMIN SERPL BCP-MCNC: 3.6 G/DL (ref 3.4–5)
ALP SERPL-CCNC: 120 U/L (ref 33–110)
ALT SERPL W P-5'-P-CCNC: 57 U/L (ref 7–45)
ANION GAP SERPL CALC-SCNC: 18 MMOL/L (ref 10–20)
AST SERPL W P-5'-P-CCNC: 134 U/L (ref 9–39)
ATRIAL RATE: 71 BPM
ATRIAL RATE: 78 BPM
BILIRUB SERPL-MCNC: 1.3 MG/DL (ref 0–1.2)
BUN SERPL-MCNC: 10 MG/DL (ref 6–23)
CALCIUM SERPL-MCNC: 8.8 MG/DL (ref 8.6–10.3)
CHLORIDE SERPL-SCNC: 91 MMOL/L (ref 98–107)
CO2 SERPL-SCNC: 27 MMOL/L (ref 21–32)
CREAT SERPL-MCNC: 0.61 MG/DL (ref 0.5–1.05)
EGFRCR SERPLBLD CKD-EPI 2021: >90 ML/MIN/1.73M*2
ERYTHROCYTE [DISTWIDTH] IN BLOOD BY AUTOMATED COUNT: 12.5 % (ref 11.5–14.5)
GLUCOSE SERPL-MCNC: 114 MG/DL (ref 74–99)
HCT VFR BLD AUTO: 22.5 % (ref 36–46)
HGB BLD-MCNC: 8 G/DL (ref 12–16)
MCH RBC QN AUTO: 29.3 PG (ref 26–34)
MCHC RBC AUTO-ENTMCNC: 35.6 G/DL (ref 32–36)
MCV RBC AUTO: 82 FL (ref 80–100)
NRBC BLD-RTO: 0.2 /100 WBCS (ref 0–0)
P AXIS: 55 DEGREES
P AXIS: 83 DEGREES
P OFFSET: 195 MS
P OFFSET: 196 MS
P ONSET: 155 MS
P ONSET: 157 MS
PLATELET # BLD AUTO: 183 X10*3/UL (ref 150–450)
POTASSIUM SERPL-SCNC: 3.2 MMOL/L (ref 3.5–5.3)
PR INTERVAL: 136 MS
PR INTERVAL: 136 MS
PROT SERPL-MCNC: 7 G/DL (ref 6.4–8.2)
Q ONSET: 223 MS
Q ONSET: 225 MS
QRS COUNT: 11 BEATS
QRS COUNT: 12 BEATS
QRS DURATION: 68 MS
QRS DURATION: 76 MS
QT INTERVAL: 414 MS
QT INTERVAL: 458 MS
QTC CALCULATION(BAZETT): 471 MS
QTC CALCULATION(BAZETT): 497 MS
QTC FREDERICIA: 451 MS
QTC FREDERICIA: 484 MS
R AXIS: 14 DEGREES
R AXIS: 19 DEGREES
RBC # BLD AUTO: 2.73 X10*6/UL (ref 4–5.2)
SODIUM SERPL-SCNC: 133 MMOL/L (ref 136–145)
T AXIS: 49 DEGREES
T AXIS: 52 DEGREES
T OFFSET: 432 MS
T OFFSET: 452 MS
VENTRICULAR RATE: 71 BPM
VENTRICULAR RATE: 78 BPM
WBC # BLD AUTO: 9.7 X10*3/UL (ref 4.4–11.3)

## 2024-06-13 PROCEDURE — 93010 ELECTROCARDIOGRAM REPORT: CPT | Performed by: INTERNAL MEDICINE

## 2024-06-13 PROCEDURE — 84075 ASSAY ALKALINE PHOSPHATASE: CPT | Performed by: SURGERY

## 2024-06-13 PROCEDURE — 2500000001 HC RX 250 WO HCPCS SELF ADMINISTERED DRUGS (ALT 637 FOR MEDICARE OP): Performed by: SURGERY

## 2024-06-13 PROCEDURE — 36415 COLL VENOUS BLD VENIPUNCTURE: CPT | Performed by: SURGERY

## 2024-06-13 PROCEDURE — 2500000004 HC RX 250 GENERAL PHARMACY W/ HCPCS (ALT 636 FOR OP/ED): Performed by: STUDENT IN AN ORGANIZED HEALTH CARE EDUCATION/TRAINING PROGRAM

## 2024-06-13 PROCEDURE — 2500000001 HC RX 250 WO HCPCS SELF ADMINISTERED DRUGS (ALT 637 FOR MEDICARE OP): Performed by: STUDENT IN AN ORGANIZED HEALTH CARE EDUCATION/TRAINING PROGRAM

## 2024-06-13 PROCEDURE — 2500000004 HC RX 250 GENERAL PHARMACY W/ HCPCS (ALT 636 FOR OP/ED): Performed by: SURGERY

## 2024-06-13 PROCEDURE — 1100000001 HC PRIVATE ROOM DAILY

## 2024-06-13 PROCEDURE — 80053 COMPREHEN METABOLIC PANEL: CPT | Performed by: SURGERY

## 2024-06-13 PROCEDURE — 85027 COMPLETE CBC AUTOMATED: CPT | Performed by: SURGERY

## 2024-06-13 PROCEDURE — 2500000004 HC RX 250 GENERAL PHARMACY W/ HCPCS (ALT 636 FOR OP/ED): Performed by: INTERNAL MEDICINE

## 2024-06-13 PROCEDURE — 99233 SBSQ HOSP IP/OBS HIGH 50: CPT | Performed by: INTERNAL MEDICINE

## 2024-06-13 PROCEDURE — 93005 ELECTROCARDIOGRAM TRACING: CPT

## 2024-06-13 PROCEDURE — 99232 SBSQ HOSP IP/OBS MODERATE 35: CPT | Performed by: STUDENT IN AN ORGANIZED HEALTH CARE EDUCATION/TRAINING PROGRAM

## 2024-06-13 PROCEDURE — C9113 INJ PANTOPRAZOLE SODIUM, VIA: HCPCS | Performed by: SURGERY

## 2024-06-13 RX ORDER — SCOLOPAMINE TRANSDERMAL SYSTEM 1 MG/1
1 PATCH, EXTENDED RELEASE TRANSDERMAL
Status: DISCONTINUED | OUTPATIENT
Start: 2024-06-13 | End: 2024-06-14 | Stop reason: HOSPADM

## 2024-06-13 RX ORDER — POTASSIUM CHLORIDE 14.9 MG/ML
20 INJECTION INTRAVENOUS ONCE
Status: COMPLETED | OUTPATIENT
Start: 2024-06-13 | End: 2024-06-13

## 2024-06-13 RX ORDER — OXYCODONE AND ACETAMINOPHEN 5; 325 MG/1; MG/1
1 TABLET ORAL ONCE
Status: COMPLETED | OUTPATIENT
Start: 2024-06-13 | End: 2024-06-13

## 2024-06-13 RX ORDER — HYDRALAZINE HYDROCHLORIDE 20 MG/ML
10 INJECTION INTRAMUSCULAR; INTRAVENOUS EVERY 6 HOURS PRN
Status: DISCONTINUED | OUTPATIENT
Start: 2024-06-13 | End: 2024-06-14 | Stop reason: HOSPADM

## 2024-06-13 RX ORDER — POTASSIUM CHLORIDE 14.9 MG/ML
20 INJECTION INTRAVENOUS
Status: DISPENSED | OUTPATIENT
Start: 2024-06-13 | End: 2024-06-13

## 2024-06-13 RX ADMIN — PANTOPRAZOLE SODIUM 40 MG: 40 INJECTION, POWDER, FOR SOLUTION INTRAVENOUS at 08:55

## 2024-06-13 RX ADMIN — KETOROLAC TROMETHAMINE 15 MG: 30 INJECTION, SOLUTION INTRAMUSCULAR at 02:00

## 2024-06-13 RX ADMIN — KETOROLAC TROMETHAMINE 15 MG: 30 INJECTION, SOLUTION INTRAMUSCULAR at 19:03

## 2024-06-13 RX ADMIN — OXYCODONE HYDROCHLORIDE AND ACETAMINOPHEN 1 TABLET: 5; 325 TABLET ORAL at 10:04

## 2024-06-13 RX ADMIN — ONDANSETRON 4 MG: 2 INJECTION INTRAMUSCULAR; INTRAVENOUS at 08:22

## 2024-06-13 RX ADMIN — HYDROMORPHONE HYDROCHLORIDE 0.4 MG: 1 INJECTION, SOLUTION INTRAMUSCULAR; INTRAVENOUS; SUBCUTANEOUS at 02:49

## 2024-06-13 RX ADMIN — POTASSIUM CHLORIDE 20 MEQ: 14.9 INJECTION, SOLUTION INTRAVENOUS at 21:16

## 2024-06-13 RX ADMIN — HYDROMORPHONE HYDROCHLORIDE 0.4 MG: 1 INJECTION, SOLUTION INTRAMUSCULAR; INTRAVENOUS; SUBCUTANEOUS at 21:42

## 2024-06-13 RX ADMIN — SCOLOPAMINE TRANSDERMAL SYSTEM 1 PATCH: 1 PATCH, EXTENDED RELEASE TRANSDERMAL at 10:30

## 2024-06-13 RX ADMIN — ONDANSETRON 4 MG: 2 INJECTION INTRAMUSCULAR; INTRAVENOUS at 16:49

## 2024-06-13 RX ADMIN — HYDRALAZINE HYDROCHLORIDE 10 MG: 20 INJECTION INTRAMUSCULAR; INTRAVENOUS at 16:49

## 2024-06-13 RX ADMIN — ENOXAPARIN SODIUM 40 MG: 40 INJECTION SUBCUTANEOUS at 08:55

## 2024-06-13 RX ADMIN — POTASSIUM CHLORIDE 20 MEQ: 14.9 INJECTION, SOLUTION INTRAVENOUS at 12:25

## 2024-06-13 RX ADMIN — HYDROMORPHONE HYDROCHLORIDE 0.4 MG: 1 INJECTION, SOLUTION INTRAMUSCULAR; INTRAVENOUS; SUBCUTANEOUS at 08:21

## 2024-06-13 RX ADMIN — KETOROLAC TROMETHAMINE 15 MG: 30 INJECTION, SOLUTION INTRAMUSCULAR at 10:03

## 2024-06-13 RX ADMIN — OXYCODONE HYDROCHLORIDE AND ACETAMINOPHEN 1 TABLET: 5; 325 TABLET ORAL at 11:51

## 2024-06-13 RX ADMIN — OXYCODONE HYDROCHLORIDE AND ACETAMINOPHEN 1 TABLET: 5; 325 TABLET ORAL at 19:04

## 2024-06-13 RX ADMIN — HYDROMORPHONE HYDROCHLORIDE 0.4 MG: 1 INJECTION, SOLUTION INTRAMUSCULAR; INTRAVENOUS; SUBCUTANEOUS at 16:49

## 2024-06-13 RX ADMIN — KETOROLAC TROMETHAMINE 15 MG: 30 INJECTION, SOLUTION INTRAMUSCULAR at 23:27

## 2024-06-13 RX ADMIN — POLYETHYLENE GLYCOL 3350 17 G: 17 POWDER, FOR SOLUTION ORAL at 08:56

## 2024-06-13 ASSESSMENT — PAIN DESCRIPTION - LOCATION
LOCATION: ABDOMEN
LOCATION: ABDOMEN

## 2024-06-13 ASSESSMENT — COGNITIVE AND FUNCTIONAL STATUS - GENERAL
DAILY ACTIVITIY SCORE: 24
MOBILITY SCORE: 24
MOBILITY SCORE: 24
DAILY ACTIVITIY SCORE: 24

## 2024-06-13 ASSESSMENT — PAIN SCALES - WONG BAKER
WONGBAKER_NUMERICALRESPONSE: HURTS WHOLE LOT
WONGBAKER_NUMERICALRESPONSE: HURTS WHOLE LOT
WONGBAKER_NUMERICALRESPONSE: HURTS WORST

## 2024-06-13 ASSESSMENT — PAIN SCALES - GENERAL
PAINLEVEL_OUTOF10: 6
PAINLEVEL_OUTOF10: 10 - WORST POSSIBLE PAIN
PAINLEVEL_OUTOF10: 9
PAINLEVEL_OUTOF10: 10 - WORST POSSIBLE PAIN
PAINLEVEL_OUTOF10: 9

## 2024-06-13 ASSESSMENT — PAIN - FUNCTIONAL ASSESSMENT
PAIN_FUNCTIONAL_ASSESSMENT: 0-10

## 2024-06-13 ASSESSMENT — PAIN DESCRIPTION - ORIENTATION: ORIENTATION: RIGHT

## 2024-06-13 NOTE — NURSING NOTE
Please have patient repeat in one week Thanks Upon arrival the patient was sitting at the bedside eating a sandwich. The patient was AO+4, vss, up independently, ABC's intact, GCS+15, , c/o intermittent emesis 3 bouts treated with tx antiemetic, cool cloth, and clear liquids. The patient remained stable pain reported at a tolerable level, no sob, c/p, dizziness,or hemorrhaging, rounding ensued care transferred to the oncoming rn

## 2024-06-13 NOTE — PROGRESS NOTES
Deloris Mcfadden is a 39 y.o. female on day 2 of admission presenting with Acute pancreatitis, unspecified complication status, unspecified pancreatitis type ([facility]).      Subjective   In a lot of pain at time of assessment, only got IV dilaudid overnight. Reports tolerating diet, +void. + flatus  Endorsing nausea and emesis this morning secondary to uncontrolled pain      Denies  F/C, CP, SOB      Objective     PE:  Constitutional: A&Ox3, calm and cooperative, appears uncomfortable   Eyes: EOMI, clear sclera   Cardiovascular: Normal rate and regular rhythm. No murmurs  Respiratory/Thorax: CTAB, on RA  Gastrointestinal: Abdomen soft, mildly distended, appropriately tender, hypoactive BS, lap sites C/D/I with dermabond, well approximated w/o surrounding erythema or drainage.   Genitourinary: Voiding independently   Musculoskeletal: ROM intact  Extremities: No peripheral edema  Neurological: A&Ox3, No focal deficits   Psychological: Appropriate mood and behavior      Last Recorded Vitals  Vitals:    06/12/24 2040 06/13/24 0254 06/13/24 0734 06/13/24 0840   BP: 149/87 (!) 130/95 151/88 142/82   BP Location: Left arm Left arm  Left arm   Patient Position: Lying Lying  Lying   Pulse: 60 76 88 87   Resp: 18 18 20 22   Temp: 36.6 °C (97.9 °F) 36.2 °C (97.2 °F) 36.4 °C (97.5 °F) 36.3 °C (97.4 °F)   TempSrc: Oral Oral Oral Oral   SpO2: 100% 100% 100% 100%   Weight:       Height:             Relevant Results    Imaging:     .   .=== 06/11/24 ===    CT CHEST ABDOMEN PELVIS W IV CONTRAST    - Impression -  CHEST:  1.  No evidence of a pneumothorax.  2.  Posterior end of the left first rib and left facet joint of T1 cut  off on this study.  Otherwise, no acute osseous findings.  3.  Coronary artery calcifications.  Abdomen/pelvis:  1.  Mild edema surrounding the pancreatic head, suggestive of  pancreatitis.  Correlation with serum amylase and lipase levels  recommended.  2.  Cholelithiasis.  3.  Fatty infiltration of the  "liver.  4.  No abnormal fluid collections surrounding the abdominal viscera.  5.  No acute osseous findings.  6.  Left adnexal cystic structure, most likely an ovarian cyst.  Follow-up with pelvic sonogram is recommended.  7.  Minimal free fluid in the pelvis.  Signed by Favio Hdz MD         Lab Results:   Lab Results   Component Value Date    WBC 7.5 06/12/2024    HGB 11.2 (L) 06/12/2024    HCT 33.3 (L) 06/12/2024    MCV 86 06/12/2024     06/12/2024     Lab Results   Component Value Date    GLUCOSE 103 (H) 06/12/2024    CALCIUM 9.0 06/12/2024     (L) 06/12/2024    K 2.8 (LL) 06/12/2024    CO2 28 06/12/2024    CL 89 (L) 06/12/2024    BUN 8 06/12/2024    CREATININE 0.62 06/12/2024     Results from last 72 hours   Lab Units 06/12/24  0541   ALK PHOS U/L 148*   BILIRUBIN TOTAL mg/dL 1.5*   BILIRUBIN DIRECT mg/dL 0.7*   PROTEIN TOTAL g/dL 7.4   ALT U/L 53*   AST U/L 96*   ALBUMIN g/dL 4.0     Estimated Creatinine Clearance: 96.9 mL/min (by C-G formula based on SCr of 0.62 mg/dL).  No results found for: \"CRP\"      Assessment/Plan   Deloris Mcfadden is a 39 y.o. female on day 2 of admission presenting with gallstone pancreatitis     Now POD1 lap juliane  Intra-op findings: Cholangiogram appeared normal. Massively distended gallbladder under some tension       Plan:  - abdomen mildly distended, soft, laparotomy sites c/d/i, well approximate with Dermabond, no erythema or drainage   - VSS, afebrile  - labs: awaiting LFTs to result  - encourage IS, encourage ambulation  - diet: low fat  - pain regimen: add percocet 5, adjust IV dilaudid as breakthrough  - abx: zosyh x 1  - DVT prophylaxis: SCD/lovenox  - Continue current pain control regimen, pain well controlled    Dispo:  Discussed plan with Dr. Harden. Possible discharge today vs. Tomorrow pending pain control and lab results    I spent 35 minutes in the professional and overall care of this patient.           Shraddha Galindo PA-C           "

## 2024-06-13 NOTE — CARE PLAN
Problem: Pain  Goal: Takes deep breaths with improved pain control throughout the shift  Outcome: Progressing  Goal: Turns in bed with improved pain control throughout the shift  Outcome: Progressing  Goal: Performs ADL's with improved pain control throughout shift  Outcome: Progressing  Goal: Participates in PT with improved pain control throughout the shift  Outcome: Progressing  Goal: Free from opioid side effects throughout the shift  Outcome: Progressing
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
## 2024-06-13 NOTE — POST-PROCEDURE NOTE
Patient is POD 0 Laparoscopic cholecystectomy and intraoperative cholangiogram Findings: Cholangiogram appeared normal.  Massively distended gallbladder under some tension with .  Patient reports post-op nausea and vomiting. Pain is well controlled. yet to void.    PE:  Constitutional: A&Ox3, calm and cooperative    Head/Neck: Neck supple, no JVD    Cardiovascular: RRR    Respiratory/Thorax: Non labored breathing on RA    Gastrointestinal: Abdomen nondistended, soft, nontender. Lap sites C/D/I, edges well approximated, covered in Dermabond    Genitourinary: Yet to void    Musculoskeletal: ROM intact, no joint swelling, normal strength    Extremities: FORDE, No peripheral edema    Neurological: No focal deficits     Psychological: Appropriate mood and behavior    Skin: Warm and dry.     /90   Pulse 77   Temp 36.8 °C (98.2 °F) (Temporal)   Resp 15   Ht 1.524 m (5')   Wt 59 kg (130 lb)   SpO2 100%   BMI 25.39 kg/m²    Radiology and labs reviewed. VSS, afebrile.    Plan:   - Diet: Low fat diet  - Continue current pain regimen   - PRN antiemetic   - DVT Proph: SCDs/ ambulate/ Lovenox  - Bowel regimen: Miralax  - Monitor VS every 4 hours   - Labs ordered for AM   - IS every hour while awake   - Encourage ambulation / OOB as tolerated   - Instructed on post operative care, s/s of infection  - Monitor lap sites for drainage, erythema, excessive bruising   - Continue supportive care  - F/u with Dr. Harden outpatient in 10-14 days once d/c from hospital     Dispo: Post-op nausea and vomiting. Will continue to monitor diet tolerance    Total time spent 25 minutes, and greater than 50% of time was spent in counseling/coordination of care

## 2024-06-13 NOTE — DISCHARGE INSTRUCTIONS
Instructions After Gallbladder Surgery    Wound Care:   -Ice packs to wounds every hour the first day  -Ok to shower in 24 hours  -no baths or swimming for 2 weeks  -keep wound clean and dry  -do not apply topical creams or ointments  -call if you notice redness around wound, foul-smelling drainage, or increasing pain     Diet:   -Avoid greasy, fatty foods first few weeks   -Monitor, soft meals first day or two after surgery    Activity   -Take it easy for the first 48 hours   -stairs and walks are fine   -resume activities gradually over the first week   -ask Dr Harden before resuming strenuous physical exertions   -No driving while on pain medication    Medications   -Pain medicine prescription attached for severe pain   -You can also take Motrin/Ibuprofen 400mg every 6 hours for mild pain   -Resume your home medications unless otherwise directed   -To avoid constipation please take Colace 100mg twice a day (over the counter)    Other Instructions   -Call to make appointment within 1-2 days:  [phone]   -Call the doctor for the following:  severe unrelieved pain  fevers > 101F  Nausea/vomiting  wound issues  insurance/return to work forms  shortness of breath  chest pains   -Feedback on your surgical experience is appreciated!

## 2024-06-14 ENCOUNTER — PHARMACY VISIT (OUTPATIENT)
Dept: PHARMACY | Facility: CLINIC | Age: 39
End: 2024-06-14
Payer: MEDICARE

## 2024-06-14 VITALS
SYSTOLIC BLOOD PRESSURE: 122 MMHG | TEMPERATURE: 97.7 F | DIASTOLIC BLOOD PRESSURE: 65 MMHG | RESPIRATION RATE: 16 BRPM | BODY MASS INDEX: 25.52 KG/M2 | WEIGHT: 130 LBS | HEART RATE: 73 BPM | HEIGHT: 60 IN | OXYGEN SATURATION: 100 %

## 2024-06-14 LAB
ALBUMIN SERPL BCP-MCNC: 3.6 G/DL (ref 3.4–5)
ALP SERPL-CCNC: 111 U/L (ref 33–110)
ALT SERPL W P-5'-P-CCNC: 47 U/L (ref 7–45)
ANION GAP SERPL CALC-SCNC: 14 MMOL/L (ref 10–20)
AST SERPL W P-5'-P-CCNC: 88 U/L (ref 9–39)
BILIRUB SERPL-MCNC: 1 MG/DL (ref 0–1.2)
BUN SERPL-MCNC: 9 MG/DL (ref 6–23)
CALCIUM SERPL-MCNC: 8.6 MG/DL (ref 8.6–10.3)
CHLORIDE SERPL-SCNC: 94 MMOL/L (ref 98–107)
CO2 SERPL-SCNC: 26 MMOL/L (ref 21–32)
CREAT SERPL-MCNC: 0.6 MG/DL (ref 0.5–1.05)
EGFRCR SERPLBLD CKD-EPI 2021: >90 ML/MIN/1.73M*2
ERYTHROCYTE [DISTWIDTH] IN BLOOD BY AUTOMATED COUNT: 13.1 % (ref 11.5–14.5)
GLUCOSE SERPL-MCNC: 100 MG/DL (ref 74–99)
HCT VFR BLD AUTO: 21.7 % (ref 36–46)
HGB BLD-MCNC: 7.4 G/DL (ref 12–16)
MAGNESIUM SERPL-MCNC: 1.3 MG/DL (ref 1.6–2.4)
MCH RBC QN AUTO: 30 PG (ref 26–34)
MCHC RBC AUTO-ENTMCNC: 34.1 G/DL (ref 32–36)
MCV RBC AUTO: 88 FL (ref 80–100)
NRBC BLD-RTO: 0 /100 WBCS (ref 0–0)
PLATELET # BLD AUTO: 189 X10*3/UL (ref 150–450)
POTASSIUM SERPL-SCNC: 3.6 MMOL/L (ref 3.5–5.3)
PROT SERPL-MCNC: 6.8 G/DL (ref 6.4–8.2)
RBC # BLD AUTO: 2.47 X10*6/UL (ref 4–5.2)
SODIUM SERPL-SCNC: 130 MMOL/L (ref 136–145)
WBC # BLD AUTO: 9.3 X10*3/UL (ref 4.4–11.3)

## 2024-06-14 PROCEDURE — 83735 ASSAY OF MAGNESIUM: CPT | Performed by: INTERNAL MEDICINE

## 2024-06-14 PROCEDURE — 2500000004 HC RX 250 GENERAL PHARMACY W/ HCPCS (ALT 636 FOR OP/ED): Performed by: SURGERY

## 2024-06-14 PROCEDURE — 84075 ASSAY ALKALINE PHOSPHATASE: CPT | Performed by: INTERNAL MEDICINE

## 2024-06-14 PROCEDURE — RXMED WILLOW AMBULATORY MEDICATION CHARGE

## 2024-06-14 PROCEDURE — 2500000001 HC RX 250 WO HCPCS SELF ADMINISTERED DRUGS (ALT 637 FOR MEDICARE OP): Performed by: SURGERY

## 2024-06-14 PROCEDURE — 36415 COLL VENOUS BLD VENIPUNCTURE: CPT | Performed by: INTERNAL MEDICINE

## 2024-06-14 PROCEDURE — 85027 COMPLETE CBC AUTOMATED: CPT | Performed by: INTERNAL MEDICINE

## 2024-06-14 PROCEDURE — C9113 INJ PANTOPRAZOLE SODIUM, VIA: HCPCS | Performed by: SURGERY

## 2024-06-14 PROCEDURE — 80053 COMPREHEN METABOLIC PANEL: CPT | Performed by: INTERNAL MEDICINE

## 2024-06-14 PROCEDURE — 99232 SBSQ HOSP IP/OBS MODERATE 35: CPT | Performed by: REGISTERED NURSE

## 2024-06-14 PROCEDURE — 99239 HOSP IP/OBS DSCHRG MGMT >30: CPT | Performed by: INTERNAL MEDICINE

## 2024-06-14 PROCEDURE — 2500000004 HC RX 250 GENERAL PHARMACY W/ HCPCS (ALT 636 FOR OP/ED): Performed by: STUDENT IN AN ORGANIZED HEALTH CARE EDUCATION/TRAINING PROGRAM

## 2024-06-14 RX ORDER — POLYETHYLENE GLYCOL 3350 17 G/17G
17 POWDER, FOR SOLUTION ORAL DAILY
Qty: 30 PACKET | Refills: 0 | Status: ON HOLD | OUTPATIENT
Start: 2024-06-15 | End: 2024-06-29

## 2024-06-14 RX ORDER — OXYCODONE AND ACETAMINOPHEN 5; 325 MG/1; MG/1
1 TABLET ORAL EVERY 4 HOURS PRN
Qty: 5 TABLET | Refills: 0 | Status: SHIPPED | OUTPATIENT
Start: 2024-06-14 | End: 2024-06-29 | Stop reason: HOSPADM

## 2024-06-14 RX ORDER — ONDANSETRON 4 MG/1
4 TABLET, FILM COATED ORAL EVERY 8 HOURS PRN
Qty: 20 TABLET | Refills: 0 | Status: SHIPPED | OUTPATIENT
Start: 2024-06-14 | End: 2024-06-29 | Stop reason: HOSPADM

## 2024-06-14 RX ADMIN — OXYCODONE HYDROCHLORIDE AND ACETAMINOPHEN 1 TABLET: 5; 325 TABLET ORAL at 08:14

## 2024-06-14 RX ADMIN — HYDROMORPHONE HYDROCHLORIDE 0.4 MG: 1 INJECTION, SOLUTION INTRAMUSCULAR; INTRAVENOUS; SUBCUTANEOUS at 06:33

## 2024-06-14 RX ADMIN — OXYCODONE HYDROCHLORIDE AND ACETAMINOPHEN 1 TABLET: 5; 325 TABLET ORAL at 04:27

## 2024-06-14 RX ADMIN — HYDROMORPHONE HYDROCHLORIDE 0.4 MG: 1 INJECTION, SOLUTION INTRAMUSCULAR; INTRAVENOUS; SUBCUTANEOUS at 01:53

## 2024-06-14 RX ADMIN — PANTOPRAZOLE SODIUM 40 MG: 40 INJECTION, POWDER, FOR SOLUTION INTRAVENOUS at 06:33

## 2024-06-14 RX ADMIN — OXYCODONE HYDROCHLORIDE AND ACETAMINOPHEN 1 TABLET: 5; 325 TABLET ORAL at 00:24

## 2024-06-14 RX ADMIN — HYDROMORPHONE HYDROCHLORIDE 0.4 MG: 1 INJECTION, SOLUTION INTRAMUSCULAR; INTRAVENOUS; SUBCUTANEOUS at 10:19

## 2024-06-14 RX ADMIN — ENOXAPARIN SODIUM 40 MG: 40 INJECTION SUBCUTANEOUS at 08:13

## 2024-06-14 RX ADMIN — POLYETHYLENE GLYCOL 3350 17 G: 17 POWDER, FOR SOLUTION ORAL at 08:13

## 2024-06-14 RX ADMIN — KETOROLAC TROMETHAMINE 15 MG: 30 INJECTION, SOLUTION INTRAMUSCULAR at 05:22

## 2024-06-14 ASSESSMENT — COGNITIVE AND FUNCTIONAL STATUS - GENERAL
MOBILITY SCORE: 24
DAILY ACTIVITIY SCORE: 24

## 2024-06-14 ASSESSMENT — PAIN SCALES - GENERAL
PAINLEVEL_OUTOF10: 7
PAINLEVEL_OUTOF10: 9
PAINLEVEL_OUTOF10: 10 - WORST POSSIBLE PAIN
PAINLEVEL_OUTOF10: 8
PAINLEVEL_OUTOF10: 7
PAINLEVEL_OUTOF10: 10 - WORST POSSIBLE PAIN
PAINLEVEL_OUTOF10: 9

## 2024-06-14 ASSESSMENT — PAIN - FUNCTIONAL ASSESSMENT
PAIN_FUNCTIONAL_ASSESSMENT: 0-10

## 2024-06-14 ASSESSMENT — PAIN DESCRIPTION - LOCATION
LOCATION: ABDOMEN

## 2024-06-14 ASSESSMENT — PAIN SCALES - WONG BAKER
WONGBAKER_NUMERICALRESPONSE: HURTS WHOLE LOT
WONGBAKER_NUMERICALRESPONSE: HURTS WORST

## 2024-06-14 NOTE — CARE PLAN
The patient's goals for the shift include pain management    The clinical goals for the shift include pain management      Problem: Pain  Goal: Takes deep breaths with improved pain control throughout the shift  Outcome: Progressing     Problem: Pain  Goal: Performs ADL's with improved pain control throughout shift  Outcome: Progressing     Problem: Pain  Goal: Free from opioid side effects throughout the shift  Outcome: Progressing     Problem: Skin  Goal: Promote skin healing  Outcome: Progressing

## 2024-06-14 NOTE — CARE PLAN
The patient's goals for the shift include Pt. will have a safe, restful and uneventful evening    The clinical goals for the shift include Pt. will ambulate to the bathroom and have increased strength this shift

## 2024-06-14 NOTE — PROGRESS NOTES
Deloris Mcfadden is a 39 y.o. female on day 3 of admission presenting with Acute pancreatitis, unspecified complication status, unspecified pancreatitis type (HHS-HCC).      Subjective   NAEO. Patient lying in bed, NAD. Pain is controlled, denies any emesis, improved nausea. Tolerating diet. +flatus    Objective     PE:  Constitutional: A&Ox3, calm and cooperative, appears uncomfortable   Eyes: EOMI, clear sclera   Cardiovascular: Normal rate and regular rhythm. No murmurs  Respiratory/Thorax: CTAB, on RA  Gastrointestinal: Abdomen soft, mildly distended, appropriately tender, lap sites C/D/I with dermabond, well approximated w/o surrounding erythema or drainage.   Genitourinary: Voiding independently   Musculoskeletal: ROM intact  Extremities: No peripheral edema  Neurological: A&Ox3, No focal deficits   Psychological: Appropriate mood and behavior      Last Recorded Vitals  Vitals:    06/13/24 1900 06/14/24 0000 06/14/24 0400 06/14/24 0737   BP: 110/63 134/73 128/79 108/71   BP Location:  Left arm Left arm Right arm   Patient Position:  Lying Lying Sitting   Pulse: 80 75 82 75   Resp: 18 18 20 16   Temp: 36.5 °C (97.7 °F) 36.1 °C (97 °F) 36.1 °C (96.9 °F) 36.5 °C (97.7 °F)   TempSrc: Oral Temporal Temporal Oral   SpO2: 100% 99% 100% 100%   Weight:       Height:             Relevant Results    Imaging:     .   .=== 06/11/24 ===    CT CHEST ABDOMEN PELVIS W IV CONTRAST    - Impression -  CHEST:  1.  No evidence of a pneumothorax.  2.  Posterior end of the left first rib and left facet joint of T1 cut  off on this study.  Otherwise, no acute osseous findings.  3.  Coronary artery calcifications.  Abdomen/pelvis:  1.  Mild edema surrounding the pancreatic head, suggestive of  pancreatitis.  Correlation with serum amylase and lipase levels  recommended.  2.  Cholelithiasis.  3.  Fatty infiltration of the liver.  4.  No abnormal fluid collections surrounding the abdominal viscera.  5.  No acute osseous findings.  6.   "Left adnexal cystic structure, most likely an ovarian cyst.  Follow-up with pelvic sonogram is recommended.  7.  Minimal free fluid in the pelvis.  Signed by Favio Hdz MD         Lab Results:   Lab Results   Component Value Date    WBC 9.3 06/14/2024    HGB 7.4 (L) 06/14/2024    HCT 21.7 (L) 06/14/2024    MCV 88 06/14/2024     06/14/2024     Lab Results   Component Value Date    GLUCOSE 100 (H) 06/14/2024    CALCIUM 8.6 06/14/2024     (L) 06/14/2024    K 3.6 06/14/2024    CO2 26 06/14/2024    CL 94 (L) 06/14/2024    BUN 9 06/14/2024    CREATININE 0.60 06/14/2024     Results from last 72 hours   Lab Units 06/14/24  0637 06/13/24  0950 06/12/24  0541   ALK PHOS U/L 111*   < > 148*   BILIRUBIN TOTAL mg/dL 1.0   < > 1.5*   BILIRUBIN DIRECT mg/dL  --   --  0.7*   PROTEIN TOTAL g/dL 6.8   < > 7.4   ALT U/L 47*   < > 53*   AST U/L 88*   < > 96*   ALBUMIN g/dL 3.6   < > 4.0    < > = values in this interval not displayed.     Estimated Creatinine Clearance: 100.2 mL/min (by C-G formula based on SCr of 0.6 mg/dL).  No results found for: \"CRP\"      Assessment/Plan   Deloris Mcfadden is a 39 y.o. female on day 3 of admission presenting with gallstone pancreatitis     POD2 lap juliane  Intra-op findings: Cholangiogram appeared normal. Massively distended gallbladder under some tension       Plan:  - abdomen mildly distended, soft, laparotomy sites c/d/i, well approximate with Dermabond, no erythema or drainage   - Labs improved, LFTs trending down  - VSS, afebrile  - encourage IS, encourage ambulation  - diet: low fat  - DVT prophylaxis: SCD/lovenox  - Continue current pain control regimen, pain well controlled    Dispo: Pain improved, encouraged lots of walking. Surgery will sign off. Discharge per primary team. Follow up with Dr Harden in 10-14 days after discharge.     I spent 35 minutes in the professional and overall care of this patient.           Blanca Singer, APRN-CNP           "
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
## 2024-06-15 LAB
ATRIAL RATE: 84 BPM
ATRIAL RATE: 98 BPM
P AXIS: -3 DEGREES
P AXIS: 62 DEGREES
P OFFSET: 195 MS
P OFFSET: 197 MS
P ONSET: 154 MS
P ONSET: 155 MS
PR INTERVAL: 146 MS
PR INTERVAL: 148 MS
Q ONSET: 228 MS
Q ONSET: 228 MS
QRS COUNT: 14 BEATS
QRS COUNT: 16 BEATS
QRS DURATION: 70 MS
QRS DURATION: 70 MS
QT INTERVAL: 364 MS
QT INTERVAL: 404 MS
QTC CALCULATION(BAZETT): 464 MS
QTC CALCULATION(BAZETT): 477 MS
QTC FREDERICIA: 428 MS
QTC FREDERICIA: 451 MS
R AXIS: 0 DEGREES
R AXIS: 1 DEGREES
T AXIS: 54 DEGREES
T AXIS: 57 DEGREES
T OFFSET: 410 MS
T OFFSET: 430 MS
VENTRICULAR RATE: 84 BPM
VENTRICULAR RATE: 98 BPM

## 2024-06-18 LAB
ATRIAL RATE: 71 BPM
P AXIS: 55 DEGREES
P OFFSET: 195 MS
P ONSET: 155 MS
PR INTERVAL: 136 MS
Q ONSET: 223 MS
QRS COUNT: 11 BEATS
QRS DURATION: 76 MS
QT INTERVAL: 458 MS
QTC CALCULATION(BAZETT): 497 MS
QTC FREDERICIA: 484 MS
R AXIS: 14 DEGREES
T AXIS: 49 DEGREES
T OFFSET: 452 MS
VENTRICULAR RATE: 71 BPM

## 2024-06-19 LAB
LABORATORY COMMENT REPORT: NORMAL
PATH REPORT.FINAL DX SPEC: NORMAL
PATH REPORT.GROSS SPEC: NORMAL
PATH REPORT.RELEVANT HX SPEC: NORMAL
PATH REPORT.TOTAL CANCER: NORMAL

## 2024-06-22 LAB
ATRIAL RATE: 78 BPM
P AXIS: 83 DEGREES
P OFFSET: 196 MS
P ONSET: 157 MS
PR INTERVAL: 136 MS
Q ONSET: 225 MS
QRS COUNT: 12 BEATS
QRS DURATION: 68 MS
QT INTERVAL: 414 MS
QTC CALCULATION(BAZETT): 471 MS
QTC FREDERICIA: 451 MS
R AXIS: 19 DEGREES
T AXIS: 52 DEGREES
T OFFSET: 432 MS
VENTRICULAR RATE: 78 BPM

## 2024-06-26 ENCOUNTER — APPOINTMENT (OUTPATIENT)
Dept: RADIOLOGY | Facility: HOSPITAL | Age: 39
End: 2024-06-26
Payer: COMMERCIAL

## 2024-06-26 ENCOUNTER — HOSPITAL ENCOUNTER (OUTPATIENT)
Facility: HOSPITAL | Age: 39
Setting detail: OBSERVATION
LOS: 1 days | Discharge: HOME | End: 2024-06-29
Attending: STUDENT IN AN ORGANIZED HEALTH CARE EDUCATION/TRAINING PROGRAM | Admitting: HOSPITALIST
Payer: COMMERCIAL

## 2024-06-26 DIAGNOSIS — N75.1 ABSCESS OF LEFT BARTHOLIN'S GLAND: ICD-10-CM

## 2024-06-26 DIAGNOSIS — K83.9 BILE LEAK: Primary | ICD-10-CM

## 2024-06-26 DIAGNOSIS — K85.90 ACUTE PANCREATITIS, UNSPECIFIED COMPLICATION STATUS, UNSPECIFIED PANCREATITIS TYPE (HHS-HCC): ICD-10-CM

## 2024-06-26 DIAGNOSIS — K59.00 CONSTIPATION, UNSPECIFIED CONSTIPATION TYPE: ICD-10-CM

## 2024-06-26 LAB
ALBUMIN SERPL BCP-MCNC: 3.5 G/DL (ref 3.4–5)
ALP SERPL-CCNC: 169 U/L (ref 33–110)
ALT SERPL W P-5'-P-CCNC: 74 U/L (ref 7–45)
ANION GAP SERPL CALC-SCNC: 13 MMOL/L (ref 10–20)
AST SERPL W P-5'-P-CCNC: 93 U/L (ref 9–39)
B-HCG SERPL-ACNC: <2 MIU/ML
BASOPHILS # BLD AUTO: 0.04 X10*3/UL (ref 0–0.1)
BASOPHILS NFR BLD AUTO: 0.3 %
BILIRUB SERPL-MCNC: 0.8 MG/DL (ref 0–1.2)
BUN SERPL-MCNC: 10 MG/DL (ref 6–23)
CALCIUM SERPL-MCNC: 8.8 MG/DL (ref 8.6–10.3)
CHLORIDE SERPL-SCNC: 105 MMOL/L (ref 98–107)
CO2 SERPL-SCNC: 24 MMOL/L (ref 21–32)
CREAT SERPL-MCNC: 0.42 MG/DL (ref 0.5–1.05)
EGFRCR SERPLBLD CKD-EPI 2021: >90 ML/MIN/1.73M*2
EOSINOPHIL # BLD AUTO: 0.09 X10*3/UL (ref 0–0.7)
EOSINOPHIL NFR BLD AUTO: 0.7 %
ERYTHROCYTE [DISTWIDTH] IN BLOOD BY AUTOMATED COUNT: 15.9 % (ref 11.5–14.5)
GLUCOSE SERPL-MCNC: 82 MG/DL (ref 74–99)
HCT VFR BLD AUTO: 27 % (ref 36–46)
HGB BLD-MCNC: 8.5 G/DL (ref 12–16)
IMM GRANULOCYTES # BLD AUTO: 0.07 X10*3/UL (ref 0–0.7)
IMM GRANULOCYTES NFR BLD AUTO: 0.6 % (ref 0–0.9)
LIPASE SERPL-CCNC: 290 U/L (ref 9–82)
LYMPHOCYTES # BLD AUTO: 2.27 X10*3/UL (ref 1.2–4.8)
LYMPHOCYTES NFR BLD AUTO: 18 %
MCH RBC QN AUTO: 29 PG (ref 26–34)
MCHC RBC AUTO-ENTMCNC: 31.5 G/DL (ref 32–36)
MCV RBC AUTO: 92 FL (ref 80–100)
MONOCYTES # BLD AUTO: 0.75 X10*3/UL (ref 0.1–1)
MONOCYTES NFR BLD AUTO: 6 %
NEUTROPHILS # BLD AUTO: 9.38 X10*3/UL (ref 1.2–7.7)
NEUTROPHILS NFR BLD AUTO: 74.4 %
NRBC BLD-RTO: 0 /100 WBCS (ref 0–0)
PLATELET # BLD AUTO: 525 X10*3/UL (ref 150–450)
POTASSIUM SERPL-SCNC: 4.1 MMOL/L (ref 3.5–5.3)
PROT SERPL-MCNC: 7.2 G/DL (ref 6.4–8.2)
RBC # BLD AUTO: 2.93 X10*6/UL (ref 4–5.2)
SODIUM SERPL-SCNC: 138 MMOL/L (ref 136–145)
WBC # BLD AUTO: 12.6 X10*3/UL (ref 4.4–11.3)

## 2024-06-26 PROCEDURE — 99285 EMERGENCY DEPT VISIT HI MDM: CPT | Mod: 25

## 2024-06-26 PROCEDURE — 80053 COMPREHEN METABOLIC PANEL: CPT | Performed by: STUDENT IN AN ORGANIZED HEALTH CARE EDUCATION/TRAINING PROGRAM

## 2024-06-26 PROCEDURE — 36415 COLL VENOUS BLD VENIPUNCTURE: CPT | Performed by: STUDENT IN AN ORGANIZED HEALTH CARE EDUCATION/TRAINING PROGRAM

## 2024-06-26 PROCEDURE — 96374 THER/PROPH/DIAG INJ IV PUSH: CPT

## 2024-06-26 PROCEDURE — 2500000004 HC RX 250 GENERAL PHARMACY W/ HCPCS (ALT 636 FOR OP/ED): Performed by: STUDENT IN AN ORGANIZED HEALTH CARE EDUCATION/TRAINING PROGRAM

## 2024-06-26 PROCEDURE — 2500000004 HC RX 250 GENERAL PHARMACY W/ HCPCS (ALT 636 FOR OP/ED): Performed by: EMERGENCY MEDICINE

## 2024-06-26 PROCEDURE — 2550000001 HC RX 255 CONTRASTS: Performed by: STUDENT IN AN ORGANIZED HEALTH CARE EDUCATION/TRAINING PROGRAM

## 2024-06-26 PROCEDURE — 96375 TX/PRO/DX INJ NEW DRUG ADDON: CPT

## 2024-06-26 PROCEDURE — 96376 TX/PRO/DX INJ SAME DRUG ADON: CPT

## 2024-06-26 PROCEDURE — 74177 CT ABD & PELVIS W/CONTRAST: CPT | Mod: FOREIGN READ | Performed by: RADIOLOGY

## 2024-06-26 PROCEDURE — 74177 CT ABD & PELVIS W/CONTRAST: CPT

## 2024-06-26 PROCEDURE — 84702 CHORIONIC GONADOTROPIN TEST: CPT | Performed by: STUDENT IN AN ORGANIZED HEALTH CARE EDUCATION/TRAINING PROGRAM

## 2024-06-26 PROCEDURE — 83690 ASSAY OF LIPASE: CPT | Performed by: STUDENT IN AN ORGANIZED HEALTH CARE EDUCATION/TRAINING PROGRAM

## 2024-06-26 PROCEDURE — 96376 TX/PRO/DX INJ SAME DRUG ADON: CPT | Mod: 59

## 2024-06-26 PROCEDURE — 85025 COMPLETE CBC W/AUTO DIFF WBC: CPT | Performed by: STUDENT IN AN ORGANIZED HEALTH CARE EDUCATION/TRAINING PROGRAM

## 2024-06-26 RX ORDER — ONDANSETRON HYDROCHLORIDE 2 MG/ML
4 INJECTION, SOLUTION INTRAVENOUS ONCE
Status: COMPLETED | OUTPATIENT
Start: 2024-06-26 | End: 2024-06-26

## 2024-06-26 RX ORDER — MORPHINE SULFATE 4 MG/ML
4 INJECTION, SOLUTION INTRAMUSCULAR; INTRAVENOUS ONCE
Status: COMPLETED | OUTPATIENT
Start: 2024-06-26 | End: 2024-06-26

## 2024-06-26 ASSESSMENT — PAIN SCALES - GENERAL
PAINLEVEL_OUTOF10: 9
PAINLEVEL_OUTOF10: 10 - WORST POSSIBLE PAIN
PAINLEVEL_OUTOF10: 9

## 2024-06-26 ASSESSMENT — COLUMBIA-SUICIDE SEVERITY RATING SCALE - C-SSRS
1. IN THE PAST MONTH, HAVE YOU WISHED YOU WERE DEAD OR WISHED YOU COULD GO TO SLEEP AND NOT WAKE UP?: NO
2. HAVE YOU ACTUALLY HAD ANY THOUGHTS OF KILLING YOURSELF?: NO
6. HAVE YOU EVER DONE ANYTHING, STARTED TO DO ANYTHING, OR PREPARED TO DO ANYTHING TO END YOUR LIFE?: NO

## 2024-06-26 ASSESSMENT — PAIN DESCRIPTION - LOCATION
LOCATION: ABDOMEN
LOCATION: ABDOMEN

## 2024-06-26 ASSESSMENT — PAIN - FUNCTIONAL ASSESSMENT: PAIN_FUNCTIONAL_ASSESSMENT: 0-10

## 2024-06-26 NOTE — LETTER
June 29, 2024     Patient: Deloris Mcfadden   YOB: 1985   Date of Visit: 6/26/2024       To Whom It May Concern:    Please excuse Deloris Mcfadden from work from 6/26/24 to 6/29/24. She was hospitalized at [facility]. She is still recovering from her illness.    It is my medical opinion that Deloris Mcfadden may return to work on 7/8/24 .    If you have any questions or concerns, please don't hesitate to call.      Sincerely,  Electronically signed by Noam Hdz DO on 06/29/24 at 2:10 PM

## 2024-06-26 NOTE — ED PROVIDER NOTES
"EMERGENCY MEDICINE EVALUATION NOTE    History of Present Illness     Chief Complaint:   Chief Complaint   Patient presents with    Abdominal Pain    Post-op Problem       HPI: Deloris Mcfadden is a 39 y.o. female with past medical history of hyperlipidemia, WHITEHEAD, alcohol abuse who presents with complaint of abdominal pain.  Patient was noted to have gallstone pancreatitis during a recent admission on 6/12/2024 and did at that time had a laparoscopic cholecystectomy with Dr. Harden.  She reportedly tolerated the procedure well and was ultimately discharged home.  She states around 3 days after the surgery she has had worsening and persistent generalized abdominal pain as well as ecchymosis involving her entire abdomen.  She also believes it is distended.  She also states she has been constipated and did have a very small bowel movement prior to arrival.  She denies any nausea, vomiting, hematochezia, dysuria, hematuria.  She also states she has a \"bulge\" in her genital area.  Denies recent alcohol abuse.    Previous History   No past medical history on file.  No past surgical history on file.     No family history on file.  Allergies   Allergen Reactions    Azithromycin Anaphylaxis     Patient told she was allergic by her mother    Erythromycin Shortness of breath     PT states she passes out and gets blurred vision     Current Outpatient Medications   Medication Instructions    ondansetron (ZOFRAN) 4 mg, oral, Every 8 hours PRN    oxyCODONE-acetaminophen (Percocet) 5-325 mg tablet 1 tablet, oral, Every 4 hours PRN    polyethylene glycol (GLYCOLAX, MIRALAX) 17 g, oral, Daily    rosuvastatin (CRESTOR) 20 mg, oral, Daily RT       Physical Exam     Appearance: Alert, oriented , cooperative,  in acute distress. Well nourished & well hydrated.     Skin: Intact,  dry skin, no lesions, rash, petechiae or purpura.      Eyes: PERRLA, EOMs intact,  Conjunctiva pink with no redness or exudates. Cornea & anterior chamber are " clear, Eyelids without lesions. No scleral icterus.      ENT: Hearing grossly intact. External auditory canals patent, tympanic membranes intact with visible landmarks. Nares patent, mucus membranes moist. Dentition without lesions. Pharynx clear, uvula midline.      Neck: Supple, without meningismus. Thyroid not palpable. Trachea at midline. No lymphadenopathy.     Pulmonary: Clear bilaterally with good chest wall excursion. No rales, rhonchi or wheezing. No accessory muscle use or stridor.     Cardiac: Normal S1, S2 without murmur, rub, gallop or extrasystole. No JVD, Carotids without bruits.     Abdomen: Palpable hepatomegaly, scattered ecchymosis in the bilateral flanks wrapping around to the anterior abdomen, well-healed laparoscopic surgical incisions, moderate generalized tenderness to light palpation with involuntary guarding, active bowel sounds.  No CVA tenderness.     Genitourinary: Exam deferred.     Musculoskeletal: Full range of motion. no pain, edema, or deformity. Pulses full and equal. No cyanosis or clubbing.      Neurological:  Cranial nerves II through XII are grossly intact, finger-nose touch is normal, normal sensation, no weakness, no focal findings identified.     Psychiatric: Appropriate mood and affect.      Results   Labs Reviewed - No data to display  No orders to display         ED Course & Medical Decision Making     Medications   ondansetron (Zofran) injection 4 mg (has no administration in time range)   morphine injection 4 mg (has no administration in time range)     Diagnoses as of 06/27/24 1209   Bile leak     Heart Rate:  [98]   Temperature:  [36 °C (96.8 °F)]   Respirations:  [16]   BP: (134)/(91)   Height:  [152.4 cm (5')]   Weight:  [68 kg (150 lb)]   Pulse Ox:  [98 %]      Deloris Mcfadden is a 39 y.o. female with past medical history of hyperlipidemia, WHITEHEAD, alcohol abuse who presents with complaint of abdominal pain.  Patient is initially hemodynamically stable and  afebrile.  She does have significant abdominal distention, generalized tenderness, and palpable hepatomegaly on examination with voluntary guarding.  Given recent laparoscopic cholecystectomy I do concern for postsurgical complication such as bile leak, viscus perforation, or other intra-abdominal pathology such as pancreatitis.  Patient was initially treated with IV morphine and Zofran.  She was still pending CT scan of the abdomen pelvis as well as all of her lab work, signout to the oncoming attending pending these findings.    Procedures   Procedures    Diagnosis   No diagnosis found.    Disposition     Signed out to the oncoming attending pending final disposition.    ED Prescriptions    None            Mane Valero DO  06/27/24 1216

## 2024-06-26 NOTE — ED TRIAGE NOTES
Pt c/o abd pain, swelling and distension after having a laparoscopic cholecystectomy on the 12th.

## 2024-06-27 ENCOUNTER — APPOINTMENT (OUTPATIENT)
Dept: RADIOLOGY | Facility: CLINIC | Age: 39
End: 2024-06-27
Payer: COMMERCIAL

## 2024-06-27 ENCOUNTER — APPOINTMENT (OUTPATIENT)
Dept: RADIOLOGY | Facility: HOSPITAL | Age: 39
End: 2024-06-27
Payer: COMMERCIAL

## 2024-06-27 PROBLEM — K83.9 BILE LEAK: Status: ACTIVE | Noted: 2024-06-27

## 2024-06-27 LAB
ALBUMIN SERPL BCP-MCNC: 3.3 G/DL (ref 3.4–5)
ALP SERPL-CCNC: 138 U/L (ref 33–110)
ALT SERPL W P-5'-P-CCNC: 62 U/L (ref 7–45)
ANION GAP SERPL CALC-SCNC: 12 MMOL/L (ref 10–20)
APPEARANCE UR: CLEAR
AST SERPL W P-5'-P-CCNC: 71 U/L (ref 9–39)
BILIRUB SERPL-MCNC: 0.7 MG/DL (ref 0–1.2)
BILIRUB UR STRIP.AUTO-MCNC: NEGATIVE MG/DL
BUN SERPL-MCNC: 10 MG/DL (ref 6–23)
CALCIUM SERPL-MCNC: 8.2 MG/DL (ref 8.6–10.3)
CHLORIDE SERPL-SCNC: 105 MMOL/L (ref 98–107)
CO2 SERPL-SCNC: 22 MMOL/L (ref 21–32)
COLOR UR: YELLOW
CREAT SERPL-MCNC: 0.33 MG/DL (ref 0.5–1.05)
EGFRCR SERPLBLD CKD-EPI 2021: >90 ML/MIN/1.73M*2
ERYTHROCYTE [DISTWIDTH] IN BLOOD BY AUTOMATED COUNT: 15.9 % (ref 11.5–14.5)
GLUCOSE SERPL-MCNC: 104 MG/DL (ref 74–99)
GLUCOSE UR STRIP.AUTO-MCNC: NORMAL MG/DL
HCT VFR BLD AUTO: 25.4 % (ref 36–46)
HGB BLD-MCNC: 7.9 G/DL (ref 12–16)
HOLD SPECIMEN: NORMAL
INR PPP: 1.2 (ref 0.9–1.1)
KETONES UR STRIP.AUTO-MCNC: ABNORMAL MG/DL
LEUKOCYTE ESTERASE UR QL STRIP.AUTO: ABNORMAL
MCH RBC QN AUTO: 28.9 PG (ref 26–34)
MCHC RBC AUTO-ENTMCNC: 31.1 G/DL (ref 32–36)
MCV RBC AUTO: 93 FL (ref 80–100)
MUCOUS THREADS #/AREA URNS AUTO: ABNORMAL /LPF
NITRITE UR QL STRIP.AUTO: NEGATIVE
NRBC BLD-RTO: 0 /100 WBCS (ref 0–0)
PH UR STRIP.AUTO: 6 [PH]
PLATELET # BLD AUTO: 465 X10*3/UL (ref 150–450)
POTASSIUM SERPL-SCNC: 3.3 MMOL/L (ref 3.5–5.3)
PROT SERPL-MCNC: 6.8 G/DL (ref 6.4–8.2)
PROT UR STRIP.AUTO-MCNC: ABNORMAL MG/DL
PROTHROMBIN TIME: 13.6 SECONDS (ref 9.8–12.8)
RBC # BLD AUTO: 2.73 X10*6/UL (ref 4–5.2)
RBC # UR STRIP.AUTO: NEGATIVE /UL
RBC #/AREA URNS AUTO: ABNORMAL /HPF
SODIUM SERPL-SCNC: 136 MMOL/L (ref 136–145)
SP GR UR STRIP.AUTO: >1.05
SQUAMOUS #/AREA URNS AUTO: ABNORMAL /HPF
UROBILINOGEN UR STRIP.AUTO-MCNC: NORMAL MG/DL
WBC # BLD AUTO: 9.7 X10*3/UL (ref 4.4–11.3)
WBC #/AREA URNS AUTO: ABNORMAL /HPF

## 2024-06-27 PROCEDURE — 10060 I&D ABSCESS SIMPLE/SINGLE: CPT | Performed by: OBSTETRICS & GYNECOLOGY

## 2024-06-27 PROCEDURE — 99221 1ST HOSP IP/OBS SF/LOW 40: CPT | Performed by: OBSTETRICS & GYNECOLOGY

## 2024-06-27 PROCEDURE — 96375 TX/PRO/DX INJ NEW DRUG ADDON: CPT | Mod: 59

## 2024-06-27 PROCEDURE — 85610 PROTHROMBIN TIME: CPT | Performed by: NURSE PRACTITIONER

## 2024-06-27 PROCEDURE — 96376 TX/PRO/DX INJ SAME DRUG ADON: CPT | Mod: 59

## 2024-06-27 PROCEDURE — 78227 HEPATOBIL SYST IMAGE W/DRUG: CPT

## 2024-06-27 PROCEDURE — 96365 THER/PROPH/DIAG IV INF INIT: CPT | Mod: 59

## 2024-06-27 PROCEDURE — A9537 TC99M MEBROFENIN: HCPCS | Performed by: EMERGENCY MEDICINE

## 2024-06-27 PROCEDURE — 3430000001 HC RX 343 DIAGNOSTIC RADIOPHARMACEUTICALS: Performed by: EMERGENCY MEDICINE

## 2024-06-27 PROCEDURE — 85027 COMPLETE CBC AUTOMATED: CPT | Performed by: INTERNAL MEDICINE

## 2024-06-27 PROCEDURE — 82746 ASSAY OF FOLIC ACID SERUM: CPT | Mod: AHULAB | Performed by: HOSPITALIST

## 2024-06-27 PROCEDURE — 1100000001 HC PRIVATE ROOM DAILY

## 2024-06-27 PROCEDURE — 2500000001 HC RX 250 WO HCPCS SELF ADMINISTERED DRUGS (ALT 637 FOR MEDICARE OP): Performed by: HOSPITALIST

## 2024-06-27 PROCEDURE — 36415 COLL VENOUS BLD VENIPUNCTURE: CPT | Performed by: INTERNAL MEDICINE

## 2024-06-27 PROCEDURE — 99221 1ST HOSP IP/OBS SF/LOW 40: CPT | Performed by: NURSE PRACTITIONER

## 2024-06-27 PROCEDURE — 81001 URINALYSIS AUTO W/SCOPE: CPT | Performed by: STUDENT IN AN ORGANIZED HEALTH CARE EDUCATION/TRAINING PROGRAM

## 2024-06-27 PROCEDURE — 78226 HEPATOBILIARY SYSTEM IMAGING: CPT | Performed by: RADIOLOGY

## 2024-06-27 PROCEDURE — 2500000004 HC RX 250 GENERAL PHARMACY W/ HCPCS (ALT 636 FOR OP/ED): Performed by: HOSPITALIST

## 2024-06-27 PROCEDURE — 2500000004 HC RX 250 GENERAL PHARMACY W/ HCPCS (ALT 636 FOR OP/ED): Performed by: EMERGENCY MEDICINE

## 2024-06-27 PROCEDURE — C9113 INJ PANTOPRAZOLE SODIUM, VIA: HCPCS | Performed by: INTERNAL MEDICINE

## 2024-06-27 PROCEDURE — 84075 ASSAY ALKALINE PHOSPHATASE: CPT

## 2024-06-27 PROCEDURE — 99222 1ST HOSP IP/OBS MODERATE 55: CPT | Performed by: INTERNAL MEDICINE

## 2024-06-27 PROCEDURE — 96361 HYDRATE IV INFUSION ADD-ON: CPT | Mod: 59

## 2024-06-27 PROCEDURE — 56420 I&D BARTHOLINS GLAND ABSCESS: CPT

## 2024-06-27 PROCEDURE — 36415 COLL VENOUS BLD VENIPUNCTURE: CPT | Performed by: NURSE PRACTITIONER

## 2024-06-27 PROCEDURE — 2500000004 HC RX 250 GENERAL PHARMACY W/ HCPCS (ALT 636 FOR OP/ED): Performed by: INTERNAL MEDICINE

## 2024-06-27 PROCEDURE — 87086 URINE CULTURE/COLONY COUNT: CPT | Mod: AHULAB | Performed by: STUDENT IN AN ORGANIZED HEALTH CARE EDUCATION/TRAINING PROGRAM

## 2024-06-27 PROCEDURE — 96376 TX/PRO/DX INJ SAME DRUG ADON: CPT

## 2024-06-27 PROCEDURE — 82607 VITAMIN B-12: CPT | Mod: AHULAB | Performed by: HOSPITALIST

## 2024-06-27 PROCEDURE — 99024 POSTOP FOLLOW-UP VISIT: CPT | Performed by: SURGERY

## 2024-06-27 PROCEDURE — 2500000002 HC RX 250 W HCPCS SELF ADMINISTERED DRUGS (ALT 637 FOR MEDICARE OP, ALT 636 FOR OP/ED): Performed by: INTERNAL MEDICINE

## 2024-06-27 RX ORDER — HYDROMORPHONE HYDROCHLORIDE 0.2 MG/ML
0.2 INJECTION INTRAMUSCULAR; INTRAVENOUS; SUBCUTANEOUS EVERY 4 HOURS PRN
Status: DISCONTINUED | OUTPATIENT
Start: 2024-06-27 | End: 2024-06-29 | Stop reason: HOSPADM

## 2024-06-27 RX ORDER — OXYCODONE HYDROCHLORIDE 5 MG/1
5 TABLET ORAL EVERY 6 HOURS PRN
Status: DISCONTINUED | OUTPATIENT
Start: 2024-06-27 | End: 2024-06-29 | Stop reason: HOSPADM

## 2024-06-27 RX ORDER — ONDANSETRON 4 MG/1
4 TABLET, FILM COATED ORAL EVERY 8 HOURS PRN
Status: DISCONTINUED | OUTPATIENT
Start: 2024-06-27 | End: 2024-06-29 | Stop reason: HOSPADM

## 2024-06-27 RX ORDER — HYDROMORPHONE HYDROCHLORIDE 0.2 MG/ML
0.2 INJECTION INTRAMUSCULAR; INTRAVENOUS; SUBCUTANEOUS EVERY 4 HOURS PRN
Status: DISCONTINUED | OUTPATIENT
Start: 2024-06-27 | End: 2024-06-27

## 2024-06-27 RX ORDER — KIT FOR THE PREPARATION OF TECHNETIUM TC 99M MEBROFENIN 45 MG/10ML
5.4 INJECTION, POWDER, LYOPHILIZED, FOR SOLUTION INTRAVENOUS
Status: COMPLETED | OUTPATIENT
Start: 2024-06-27 | End: 2024-06-27

## 2024-06-27 RX ORDER — LIDOCAINE HYDROCHLORIDE 10 MG/ML
INJECTION INFILTRATION; PERINEURAL
Status: DISPENSED
Start: 2024-06-27 | End: 2024-06-27

## 2024-06-27 RX ORDER — POLYETHYLENE GLYCOL 3350 17 G/17G
17 POWDER, FOR SOLUTION ORAL DAILY
Status: DISCONTINUED | OUTPATIENT
Start: 2024-06-27 | End: 2024-06-29 | Stop reason: HOSPADM

## 2024-06-27 RX ORDER — ONDANSETRON HYDROCHLORIDE 2 MG/ML
4 INJECTION, SOLUTION INTRAVENOUS EVERY 8 HOURS PRN
Status: DISCONTINUED | OUTPATIENT
Start: 2024-06-27 | End: 2024-06-29 | Stop reason: HOSPADM

## 2024-06-27 RX ORDER — ACETAMINOPHEN 325 MG/1
650 TABLET ORAL 3 TIMES DAILY
Status: DISCONTINUED | OUTPATIENT
Start: 2024-06-27 | End: 2024-06-29 | Stop reason: HOSPADM

## 2024-06-27 RX ORDER — ACETAMINOPHEN 650 MG/1
650 SUPPOSITORY RECTAL EVERY 4 HOURS PRN
Status: DISCONTINUED | OUTPATIENT
Start: 2024-06-27 | End: 2024-06-27

## 2024-06-27 RX ORDER — SODIUM CHLORIDE 9 MG/ML
100 INJECTION, SOLUTION INTRAVENOUS CONTINUOUS
Status: ACTIVE | OUTPATIENT
Start: 2024-06-27 | End: 2024-06-27

## 2024-06-27 RX ORDER — ACETAMINOPHEN 160 MG/5ML
650 SOLUTION ORAL EVERY 4 HOURS PRN
Status: DISCONTINUED | OUTPATIENT
Start: 2024-06-27 | End: 2024-06-27

## 2024-06-27 RX ORDER — MORPHINE SULFATE 4 MG/ML
4 INJECTION, SOLUTION INTRAMUSCULAR; INTRAVENOUS ONCE
Status: COMPLETED | OUTPATIENT
Start: 2024-06-27 | End: 2024-06-27

## 2024-06-27 RX ORDER — ACETAMINOPHEN 325 MG/1
650 TABLET ORAL EVERY 4 HOURS PRN
Status: DISCONTINUED | OUTPATIENT
Start: 2024-06-27 | End: 2024-06-27

## 2024-06-27 RX ORDER — ROSUVASTATIN CALCIUM 20 MG/1
20 TABLET, COATED ORAL DAILY
Status: DISCONTINUED | OUTPATIENT
Start: 2024-06-27 | End: 2024-06-29 | Stop reason: HOSPADM

## 2024-06-27 RX ORDER — PANTOPRAZOLE SODIUM 40 MG/10ML
40 INJECTION, POWDER, LYOPHILIZED, FOR SOLUTION INTRAVENOUS
Status: DISCONTINUED | OUTPATIENT
Start: 2024-06-27 | End: 2024-06-29 | Stop reason: HOSPADM

## 2024-06-27 RX ORDER — PANTOPRAZOLE SODIUM 40 MG/1
40 TABLET, DELAYED RELEASE ORAL
Status: DISCONTINUED | OUTPATIENT
Start: 2024-06-27 | End: 2024-06-29 | Stop reason: HOSPADM

## 2024-06-27 SDOH — SOCIAL STABILITY: SOCIAL INSECURITY: ARE YOU OR HAVE YOU BEEN THREATENED OR ABUSED PHYSICALLY, EMOTIONALLY, OR SEXUALLY BY ANYONE?: NO

## 2024-06-27 SDOH — SOCIAL STABILITY: SOCIAL INSECURITY: WERE YOU ABLE TO COMPLETE ALL THE BEHAVIORAL HEALTH SCREENINGS?: YES

## 2024-06-27 SDOH — SOCIAL STABILITY: SOCIAL INSECURITY: ARE THERE ANY APPARENT SIGNS OF INJURIES/BEHAVIORS THAT COULD BE RELATED TO ABUSE/NEGLECT?: NO

## 2024-06-27 SDOH — SOCIAL STABILITY: SOCIAL INSECURITY: HAVE YOU HAD THOUGHTS OF HARMING ANYONE ELSE?: NO

## 2024-06-27 SDOH — SOCIAL STABILITY: SOCIAL INSECURITY: ABUSE: ADULT

## 2024-06-27 SDOH — SOCIAL STABILITY: SOCIAL INSECURITY: DOES ANYONE TRY TO KEEP YOU FROM HAVING/CONTACTING OTHER FRIENDS OR DOING THINGS OUTSIDE YOUR HOME?: NO

## 2024-06-27 SDOH — SOCIAL STABILITY: SOCIAL INSECURITY: DO YOU FEEL UNSAFE GOING BACK TO THE PLACE WHERE YOU ARE LIVING?: NO

## 2024-06-27 SDOH — SOCIAL STABILITY: SOCIAL INSECURITY: HAS ANYONE EVER THREATENED TO HURT YOUR FAMILY OR YOUR PETS?: NO

## 2024-06-27 SDOH — SOCIAL STABILITY: SOCIAL INSECURITY: DO YOU FEEL ANYONE HAS EXPLOITED OR TAKEN ADVANTAGE OF YOU FINANCIALLY OR OF YOUR PERSONAL PROPERTY?: NO

## 2024-06-27 ASSESSMENT — COGNITIVE AND FUNCTIONAL STATUS - GENERAL
MOBILITY SCORE: 24
MOBILITY SCORE: 24
PATIENT BASELINE BEDBOUND: NO
DAILY ACTIVITIY SCORE: 24
MOBILITY SCORE: 24
DAILY ACTIVITIY SCORE: 24
DAILY ACTIVITIY SCORE: 24

## 2024-06-27 ASSESSMENT — ACTIVITIES OF DAILY LIVING (ADL)
FEEDING YOURSELF: INDEPENDENT
HEARING - RIGHT EAR: FUNCTIONAL
FEEDING YOURSELF: INDEPENDENT
HEARING - LEFT EAR: FUNCTIONAL
ADEQUATE_TO_COMPLETE_ADL: YES
LACK_OF_TRANSPORTATION: NO
JUDGMENT_ADEQUATE_SAFELY_COMPLETE_DAILY_ACTIVITIES: YES
TOILETING: INDEPENDENT
BATHING: INDEPENDENT
HEARING - RIGHT EAR: FUNCTIONAL
DRESSING YOURSELF: INDEPENDENT
JUDGMENT_ADEQUATE_SAFELY_COMPLETE_DAILY_ACTIVITIES: YES
WALKS IN HOME: INDEPENDENT
PATIENT'S MEMORY ADEQUATE TO SAFELY COMPLETE DAILY ACTIVITIES?: YES
WALKS IN HOME: INDEPENDENT
PATIENT'S MEMORY ADEQUATE TO SAFELY COMPLETE DAILY ACTIVITIES?: YES
HEARING - LEFT EAR: FUNCTIONAL
GROOMING: INDEPENDENT
DRESSING YOURSELF: INDEPENDENT
GROOMING: INDEPENDENT
BATHING: INDEPENDENT
ADEQUATE_TO_COMPLETE_ADL: YES
TOILETING: INDEPENDENT

## 2024-06-27 ASSESSMENT — LIFESTYLE VARIABLES
HOW OFTEN DURING THE LAST YEAR HAVE YOU FOUND THAT YOU WERE NOT ABLE TO STOP DRINKING ONCE YOU HAD STARTED: NEVER
SKIP TO QUESTIONS 9-10: 0
HOW OFTEN DO YOU HAVE 6 OR MORE DRINKS ON ONE OCCASION: WEEKLY
AUDIT-C TOTAL SCORE: 6
HOW OFTEN DO YOU HAVE A DRINK CONTAINING ALCOHOL: 2-3 TIMES A WEEK
AUDIT TOTAL SCORE: 0
HOW MANY STANDARD DRINKS CONTAINING ALCOHOL DO YOU HAVE ON A TYPICAL DAY: 1 OR 2
AUDIT-C TOTAL SCORE: 6
HOW OFTEN DURING THE LAST YEAR HAVE YOU BEEN UNABLE TO REMEMBER WHAT HAPPENED THE NIGHT BEFORE BECAUSE YOU HAD BEEN DRINKING: NEVER
HOW OFTEN DURING THE LAST YEAR HAVE YOU FAILED TO DO WHAT WAS NORMALLY EXPECTED FROM YOU BECAUSE OF DRINKING: NEVER
HAVE YOU OR SOMEONE ELSE BEEN INJURED AS A RESULT OF YOUR DRINKING: NO
HOW OFTEN DURING THE LAST YEAR HAVE YOU HAD A FEELING OF GUILT OR REMORSE AFTER DRINKING: NEVER
AUDIT TOTAL SCORE: 6
HAS A RELATIVE, FRIEND, DOCTOR, OR ANOTHER HEALTH PROFESSIONAL EXPRESSED CONCERN ABOUT YOUR DRINKING OR SUGGESTED YOU CUT DOWN: NO
HOW OFTEN DURING THE LAST YEAR HAVE YOU NEEDED AN ALCOHOLIC DRINK FIRST THING IN THE MORNING TO GET YOURSELF GOING AFTER A NIGHT OF HEAVY DRINKING: NEVER

## 2024-06-27 ASSESSMENT — ENCOUNTER SYMPTOMS
EYES NEGATIVE: 1
ENDOCRINE NEGATIVE: 1
MUSCULOSKELETAL NEGATIVE: 1
CARDIOVASCULAR NEGATIVE: 1
ALLERGIC/IMMUNOLOGIC NEGATIVE: 1
FATIGUE: 1
ABDOMINAL DISTENTION: 1
APPETITE CHANGE: 1
ACTIVITY CHANGE: 1
ABDOMINAL PAIN: 1
RESPIRATORY NEGATIVE: 1
NAUSEA: 1
HEMATOLOGIC/LYMPHATIC NEGATIVE: 1
NEUROLOGICAL NEGATIVE: 1
PSYCHIATRIC NEGATIVE: 1

## 2024-06-27 ASSESSMENT — PAIN - FUNCTIONAL ASSESSMENT
PAIN_FUNCTIONAL_ASSESSMENT: 0-10

## 2024-06-27 ASSESSMENT — PAIN SCALES - GENERAL
PAINLEVEL_OUTOF10: 10 - WORST POSSIBLE PAIN
PAINLEVEL_OUTOF10: 0 - NO PAIN
PAINLEVEL_OUTOF10: 9
PAINLEVEL_OUTOF10: 5 - MODERATE PAIN
PAINLEVEL_OUTOF10: 10 - WORST POSSIBLE PAIN

## 2024-06-27 ASSESSMENT — PATIENT HEALTH QUESTIONNAIRE - PHQ9
1. LITTLE INTEREST OR PLEASURE IN DOING THINGS: NOT AT ALL
SUM OF ALL RESPONSES TO PHQ9 QUESTIONS 1 & 2: 0
2. FEELING DOWN, DEPRESSED OR HOPELESS: NOT AT ALL

## 2024-06-27 ASSESSMENT — PAIN DESCRIPTION - ORIENTATION: ORIENTATION: MID

## 2024-06-27 ASSESSMENT — PAIN DESCRIPTION - LOCATION
LOCATION: VAGINA
LOCATION: VAGINA

## 2024-06-27 NOTE — CONSULTS
GYN Consult    Reason for Consult: L labial mass    Assessment/Plan    Left Bartholin's abscess status post incision and drainage.  Her current antibiotic regimen is more than enough to cover this infection, particularly after drainage.  Spoke with nursing staff about local care, sitz bath's.    Discussed with patient that Bartholin cyst and abscess can recur.  She can follow-up with me as an outpatient as needed.    Dominic Mcfadden is a 39 y.o. female with a past medical history of hyperlipidemia, WHITEHEAD, alcohol use disorder, who presented to [facility] ER complaining of abdominal pain.  She had gallstone pancreatitis on 6/12/2024 and underwent a laparoscopic cholecystectomy with Dr. Harden.  3 days after procedure began experiencing ecchymosis on abdominal wall extending to right flank and generalized abdominal pain as per H&P.    3 days ago began to experience localized left lower labial swelling.  This has become quite tender.    Obstetrical History   OB History   No obstetric history on file.       Past Medical History  History reviewed. No pertinent past medical history.     Past Surgical History   History reviewed. No pertinent surgical history.    Social History  Social History     Tobacco Use    Smoking status: Every Day     Current packs/day: 0.25     Types: Cigarettes    Smokeless tobacco: Never   Substance Use Topics    Alcohol use: Not on file     Substance and Sexual Activity   Drug Use Not on file       Allergies  Azithromycin and Erythromycin     Medications  Medications Prior to Admission   Medication Sig Dispense Refill Last Dose    ondansetron (Zofran) 4 mg tablet Take 1 tablet (4 mg) by mouth every 8 hours if needed for nausea or vomiting. 20 tablet 0 6/26/2024    oxyCODONE-acetaminophen (Percocet) 5-325 mg tablet Take 1 tablet by mouth every 4 hours if needed for moderate pain (4 - 6) or severe pain (7 - 10). 5 tablet 0 Past Week    polyethylene glycol (Glycolax, Miralax)  17 gram packet Take 17 g by mouth once daily. 30 packet 0 6/26/2024    rosuvastatin (Crestor) 20 mg tablet Take 1 tablet (20 mg) by mouth once daily.   6/26/2024       Objective    Last Vitals  Temp Pulse Resp BP MAP O2 Sat   36.7 °C (98.1 °F) 81 16 121/85   100 %     Physical Examination  GENERAL: Examination reveals a well developed, well nourished, gravid female in no acute distress. She is alert and cooperative.  ABDOMEN: soft, gravid, nontender, nondistended, no abnormal masses, no epigastric pain, abdomen is soft, protuberant, diffusely mildly tender.  There is ecchymosis below the umbilicus on the left as well as the right extending to her right flank and hip area.  PSYCHOLOGICAL: awake and alert; oriented to person, place, and time  External pelvic exam: Right labia majora and minora normal  Left labia show a 4 cm firm, tender mass at 5:00 consistent with left Bartholin's abscess.    Discussed expectant management with current antibiotic regimen, aggressive soaks/sitz bath's, versus incision and drainage.  Incision and drainage recommended.  Patient is in agreement.  Discussed potential risks of bleeding, infection, reaction to local lidocaine, as well as recurrence risk.  Verbal consent obtained.      Lab Review

## 2024-06-27 NOTE — CARE PLAN
The patient's goals for the shift include      The clinical goals for the shift include pain management      Problem: Nutrition  Goal: Less than 5 days NPO/clear liquids  Outcome: Progressing  Goal: Oral intake greater than 50%  Outcome: Progressing  Goal: Oral intake greater 75%  Outcome: Progressing  Goal: Consume prescribed supplement  Outcome: Progressing  Goal: Adequate PO fluid intake  Outcome: Progressing  Goal: Nutrition support goals are met within 48 hrs  Outcome: Progressing  Goal: Nutrition support is meeting 75% of nutrient needs  Outcome: Progressing  Goal: Tube feed tolerance  Outcome: Progressing  Goal: BG  mg/dL  Outcome: Progressing  Goal: Lab values WNL  Outcome: Progressing  Goal: Electrolytes WNL  Outcome: Progressing  Goal: Promote healing  Outcome: Progressing  Goal: Maintain stable weight  Outcome: Progressing  Goal: Reduce weight from edema/fluid  Outcome: Progressing  Goal: Gradual weight gain  Outcome: Progressing  Goal: Improve ostomy output  Outcome: Progressing     Problem: Pain - Adult  Goal: Verbalizes/displays adequate comfort level or baseline comfort level  Outcome: Progressing     Problem: Safety - Adult  Goal: Free from fall injury  Outcome: Progressing     Problem: Discharge Planning  Goal: Discharge to home or other facility with appropriate resources  Outcome: Progressing     Problem: Chronic Conditions and Co-morbidities  Goal: Patient's chronic conditions and co-morbidity symptoms are monitored and maintained or improved  Outcome: Progressing     Problem: Pain  Goal: Takes deep breaths with improved pain control throughout the shift  Outcome: Progressing  Goal: Turns in bed with improved pain control throughout the shift  Outcome: Progressing  Goal: Walks with improved pain control throughout the shift  Outcome: Progressing  Goal: Performs ADL's with improved pain control throughout shift  Outcome: Progressing  Goal: Participates in PT with improved pain control  throughout the shift  Outcome: Progressing  Goal: Free from opioid side effects throughout the shift  Outcome: Progressing  Goal: Free from acute confusion related to pain meds throughout the shift  Outcome: Progressing
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
## 2024-06-27 NOTE — POST-PROCEDURE NOTE
Diagnosis: Symptomatic left Bartholin's abscess  Planned procedure: Incision and drainage left Bartholin's abscess  Verbal consent obtained.  Nursing staff present for procedure at bedside    With patient in lithotomy position, medial aspect of the labia minora was prepped with Betadine and injected with 1 cc plain 1% lidocaine.  11.  Blade passed into cyst cavity and copious amounts of purulent fluid returned.  This was expressed and drained until no more purulence returned.    QBL: nil    Patient tolerated procedure well  There were no complications
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
## 2024-06-27 NOTE — CONSULTS
Patient is a 39-year-old female who was recently admitted to [facility] with gallstone pancreatitis.  She ultimately went laparoscopic cholecystectomy with a normal cholangiogram on 6/12/2024.    She had done well but comes now with some abdominal pain and abdominal bloating.  Denies nausea or vomiting.  She also has some swelling in her left labia    Workup includes CT scan with some fluid in the gallbladder fossa.    Feels somewhat better at this time.  She is hungry.    Past Medical History  HLD, WHITEHEAD, ETOH abuse      Surgical History  C-sections, cholecystectomy     On exam comfortable, afebrile with normal vital signs  Abdomen slightly distended, soft.  Incisions clean dry and intact.    On exam left labia indurated somewhat swollen and tender but no abscess is noted    Lab Results   Component Value Date    GLUCOSE 104 (H) 06/27/2024     06/27/2024    K 3.3 (L) 06/27/2024     06/27/2024    CO2 22 06/27/2024    ANIONGAP 12 06/27/2024    BUN 10 06/27/2024    CREATININE 0.33 (L) 06/27/2024    EGFR >90 06/27/2024    CALCIUM 8.2 (L) 06/27/2024    ALBUMIN 3.3 (L) 06/27/2024    ALKPHOS 138 (H) 06/27/2024    PROT 6.8 06/27/2024    AST 71 (H) 06/27/2024    BILITOT 0.7 06/27/2024    ALT 62 (H) 06/27/2024       Lab Results   Component Value Date    WBC 9.7 06/27/2024    HGB 7.9 (L) 06/27/2024    HCT 25.4 (L) 06/27/2024    MCV 93 06/27/2024     (H) 06/27/2024         CT:   MPRESSION:  Abnormal fluid collection in the gallbladder fossa mildly hyperintense  likely combination of biloma and/or hemorrhage. Superimposed infection  unlikely given timeframe. Clinical correlation and hepatobiliary scan  is recommended to better characterize.      Impression: 2 weeks out from uneventful laparoscopic cholecystectomy.  Comes back with some pain.  CT scan shows fluid collection gallbladder fossa.  Likely post surgical changes, hematoma.  Less likely abscess or biloma.  LFTs  Fairly normal.      Will get a HIDA scan to  evaluate for bile leak    GI seeing      Recommend antibiotics and warm compress for some induration in the left labia.

## 2024-06-27 NOTE — H&P
History Of Present Illness  Deloris Mcfadden is a 39 y.o. female with a past medical history of hyperlipidemia, WHITEHEAD, alcohol use disorder, who presented to [facility] ER complaining of abdominal pain.  She had gallstone pancreatitis on 6/12/2024 and underwent a laparoscopic cholecystectomy with Dr. Harden.  She tolerated the procedure well and was discharged home.  The patient endorses that around 3 days after the surgery she began having persistent generalized abdominal pain as well as ecchymosis involving her entire abdomen.  She also feels like her abdomen is distended.  She denies any fever chills nausea vomiting hematochezia urinary complaints.  She has not had a drink of alcohol in over a week.  CT of the abdomen pelvis showed an abnormal fluid collection in the gallbladder foci that could represent seroma, hematoma or infection     Past Medical History  Hyperlipidemia  WHITEHEAD  Alcohol use disorder  Gallstone pancreatitis    Surgical History  Laparoscopic cholecystectomy 6/12/2024     Social History  Alcohol use disorder  Denies tobacco or illicit drugs    Family History  No family history on file.     Allergies  Azithromycin and Erythromycin    Review of Systems   Constitutional:  Positive for activity change, appetite change and fatigue.   HENT: Negative.     Eyes: Negative.    Respiratory: Negative.     Cardiovascular: Negative.    Gastrointestinal:  Positive for abdominal distention, abdominal pain and nausea.   Endocrine: Negative.    Genitourinary: Negative.    Musculoskeletal: Negative.    Skin: Negative.    Allergic/Immunologic: Negative.    Neurological: Negative.    Hematological: Negative.    Psychiatric/Behavioral: Negative.     All other systems reviewed and are negative.       Physical Exam  Vitals and nursing note reviewed.   Constitutional:       Appearance: Normal appearance.   HENT:      Head: Normocephalic.      Right Ear: External ear normal.      Left Ear: External ear normal.       Nose: Nose normal.      Mouth/Throat:      Mouth: Mucous membranes are dry.      Pharynx: Oropharynx is clear.   Eyes:      Extraocular Movements: Extraocular movements intact.      Conjunctiva/sclera: Conjunctivae normal.      Pupils: Pupils are equal, round, and reactive to light.   Cardiovascular:      Rate and Rhythm: Normal rate and regular rhythm.   Pulmonary:      Effort: Pulmonary effort is normal.      Breath sounds: Normal breath sounds.   Abdominal:      General: Abdomen is flat. Bowel sounds are normal.      Palpations: Abdomen is soft.   Musculoskeletal:         General: Normal range of motion.   Skin:     General: Skin is warm and dry.   Neurological:      General: No focal deficit present.      Mental Status: She is alert. Mental status is at baseline.   Psychiatric:         Mood and Affect: Mood normal.         Behavior: Behavior normal.          Last Recorded Vitals  Blood pressure 109/71, pulse 79, temperature 36.2 °C (97.2 °F), temperature source Temporal, resp. rate 18, height 1.524 m (5'), weight 68 kg (150 lb), SpO2 100%.    Relevant Results  Meds:  Scheduled medications  pantoprazole, 40 mg, oral, Daily before breakfast   Or  pantoprazole, 40 mg, intravenous, Daily before breakfast  piperacillin-tazobactam, 3.375 g, intravenous, q6h  polyethylene glycol, 17 g, oral, Daily  rosuvastatin, 20 mg, oral, Daily      Continuous medications  sodium chloride 0.9%, 100 mL/hr      PRN medications  PRN medications: acetaminophen **OR** acetaminophen **OR** acetaminophen, HYDROmorphone, HYDROmorphone, ondansetron **OR** ondansetron   Current Outpatient Medications   Medication Instructions    ondansetron (ZOFRAN) 4 mg, oral, Every 8 hours PRN    oxyCODONE-acetaminophen (Percocet) 5-325 mg tablet 1 tablet, oral, Every 4 hours PRN    polyethylene glycol (GLYCOLAX, MIRALAX) 17 g, oral, Daily    rosuvastatin (CRESTOR) 20 mg, oral, Daily RT        Labs:  Results for orders placed or performed during the  hospital encounter of 06/26/24 (from the past 24 hour(s))   Comprehensive Metabolic Panel   Result Value Ref Range    Glucose 82 74 - 99 mg/dL    Sodium 138 136 - 145 mmol/L    Potassium 4.1 3.5 - 5.3 mmol/L    Chloride 105 98 - 107 mmol/L    Bicarbonate 24 21 - 32 mmol/L    Anion Gap 13 10 - 20 mmol/L    Urea Nitrogen 10 6 - 23 mg/dL    Creatinine 0.42 (L) 0.50 - 1.05 mg/dL    eGFR >90 >60 mL/min/1.73m*2    Calcium 8.8 8.6 - 10.3 mg/dL    Albumin 3.5 3.4 - 5.0 g/dL    Alkaline Phosphatase 169 (H) 33 - 110 U/L    Total Protein 7.2 6.4 - 8.2 g/dL    AST 93 (H) 9 - 39 U/L    Bilirubin, Total 0.8 0.0 - 1.2 mg/dL    ALT 74 (H) 7 - 45 U/L   CBC and Auto Differential   Result Value Ref Range    WBC 12.6 (H) 4.4 - 11.3 x10*3/uL    nRBC 0.0 0.0 - 0.0 /100 WBCs    RBC 2.93 (L) 4.00 - 5.20 x10*6/uL    Hemoglobin 8.5 (L) 12.0 - 16.0 g/dL    Hematocrit 27.0 (L) 36.0 - 46.0 %    MCV 92 80 - 100 fL    MCH 29.0 26.0 - 34.0 pg    MCHC 31.5 (L) 32.0 - 36.0 g/dL    RDW 15.9 (H) 11.5 - 14.5 %    Platelets 525 (H) 150 - 450 x10*3/uL    Neutrophils % 74.4 40.0 - 80.0 %    Immature Granulocytes %, Automated 0.6 0.0 - 0.9 %    Lymphocytes % 18.0 13.0 - 44.0 %    Monocytes % 6.0 2.0 - 10.0 %    Eosinophils % 0.7 0.0 - 6.0 %    Basophils % 0.3 0.0 - 2.0 %    Neutrophils Absolute 9.38 (H) 1.20 - 7.70 x10*3/uL    Immature Granulocytes Absolute, Automated 0.07 0.00 - 0.70 x10*3/uL    Lymphocytes Absolute 2.27 1.20 - 4.80 x10*3/uL    Monocytes Absolute 0.75 0.10 - 1.00 x10*3/uL    Eosinophils Absolute 0.09 0.00 - 0.70 x10*3/uL    Basophils Absolute 0.04 0.00 - 0.10 x10*3/uL   Lipase   Result Value Ref Range    Lipase 290 (H) 9 - 82 U/L   Human Chorionic Gonadotropin, Serum Quantitative   Result Value Ref Range    HCG, Beta-Quantitative <2 <5 mIU/mL      Imaging:  CT abdomen pelvis w IV contrast    Result Date: 6/26/2024  STUDY: CT Abdomen and Pelvis with IV Contrast; 6/26/2024 at 8:49 PM INDICATION: Generalized abdominal pain, status post  laparoscopic cholecystectomy; with distention. COMPARISON: US right upper quadrant 6/12/2024, CT chest, abdomen and pelvis 6/11/2024. ACCESSION NUMBER(S): [id number] ORDERING CLINICIAN: SERENITY BLOUNT TECHNIQUE: CT of the abdomen and pelvis was performed.  Contiguous axial images were obtained at 3 mm slice thickness through the abdomen and pelvis. Coronal and sagittal reconstructions at 3 mm slice thickness were performed.  Omnipaque 350 75 mL was administered intravenously.  FINDINGS: LOWER CHEST: No cardiomegaly.  No pericardial effusion.  Lung bases are clear.  ABDOMEN:  LIVER: No hepatomegaly.  There is a small amount of perihepatic fluid.  Fatty infiltration of the liver present.  Abnormal fluid collection the gallbladder fossa mildly hyperintense likely combination of biloma and/or hemorrhage.  This is approximately 7.3 x 3.2 cm in size. Superimposed infection unlikely given timeframe.  Clinical correlation and hepatobiliary scan is recommended to better characterize.  BILE DUCTS: No intrahepatic or extrahepatic biliary ductal dilatation.  GALLBLADDER: The gallbladder is absent. STOMACH: No abnormalities identified.  PANCREAS: No masses or ductal dilatation.  SPLEEN: No splenomegaly or focal splenic lesion.  ADRENAL GLANDS: No thickening or nodules.  KIDNEYS AND URETERS: Kidneys are normal in size and location.  No renal or ureteral calculi.  PELVIS:  BLADDER: No abnormalities identified.  REPRODUCTIVE ORGANS: No abnormalities identified.  BOWEL: No abnormalities identified.  VESSELS: No abnormalities identified.  Abdominal aorta is normal in caliber.  PERITONEUM/RETROPERITONEUM/LYMPH NODES: There is a small amount of free fluid in the pelvis.  No pneumoperitoneum. No lymphadenopathy.  ABDOMINAL WALL: No abnormalities identified. SOFT TISSUES: No abnormalities identified.  BONES: No acute fracture or aggressive osseous lesion.    Abnormal fluid collection in the gallbladder fossa mildly hyperintense  likely combination of biloma and/or hemorrhage. Superimposed infection unlikely given timeframe. Clinical correlation and hepatobiliary scan is recommended to better characterize. Results discussed with Dr. Bernrado Jones at approximately 11:00 PM. Signed by Vikram Palmer MD      Assessment/Plan   Abdominal pain status post laparoscopic cholecystectomy 6/12/2024 secondary to gallstone pancreatitis now with an abnormal fluid collection in the gallbladder fossa likely secondary to a combination of biloma and or hemorrhage.  Superimposed infection unlikely given timeframe.  Plan:  Zosyn  General surgery consultation  GI consultation  HIDA scan  N.p.o. for now  Pain control    Hyperlipidemia  Plan:  Crestor 20 mg orally daily  Plan:    DVT prophylaxis  Lovenox 40 mg subcutaneously daily  SCDs  I spent 60 minutes in the professional and overall care of this patient.      Yung Antonio DO

## 2024-06-27 NOTE — PROGRESS NOTES
Emergency Medicine Transition of Care Note.    I received Deloris Mcfadden in signout from Dr. Valero.  Please see the previous ED provider note for all HPI, PE and MDM up to the time of signout at 8 PM. This is in addition to the primary record.    In brief Deloris Mcfadden is an 39 y.o. female presenting for   Chief Complaint   Patient presents with    Abdominal Pain    Post-op Problem     At the time of signout we were awaiting: CT scan    Diagnoses as of 06/27/24 0535   Bile leak       Medical Decision Making    The CT scan was concerning for possible biloma in the right upper quadrant.  General surgery was consulted.  GI was consulted.  Following discussion with Dr. Harden of general surgery the patient will be admitted for antibiotics, HIDA scan and GI evaluation patient also did complain of lower pelvic pain.  Nurse Veronika did chaperone exam and pt noted to have a bartholin cyst to left labia.      Final diagnoses:   [K83.9] Bile leak           Procedure  Procedures    Bernardo Caballero MD

## 2024-06-28 PROBLEM — K91.870 POSTOPERATIVE HEMATOMA INVOLVING DIGESTIVE SYSTEM FOLLOWING DIGESTIVE SYSTEM PROCEDURE: Status: ACTIVE | Noted: 2024-06-27

## 2024-06-28 PROBLEM — N75.1 ABSCESS OF LEFT BARTHOLIN'S GLAND: Status: ACTIVE | Noted: 2024-06-28

## 2024-06-28 PROBLEM — K75.81 NASH (NONALCOHOLIC STEATOHEPATITIS): Status: ACTIVE | Noted: 2024-06-12

## 2024-06-28 PROBLEM — D64.9 NORMOCYTIC ANEMIA: Status: ACTIVE | Noted: 2024-06-28

## 2024-06-28 PROBLEM — K83.9 BILE LEAK: Status: ACTIVE | Noted: 2024-06-28

## 2024-06-28 LAB
ALBUMIN SERPL BCP-MCNC: 3 G/DL (ref 3.4–5)
ALP SERPL-CCNC: 125 U/L (ref 33–110)
ALT SERPL W P-5'-P-CCNC: 53 U/L (ref 7–45)
ANION GAP SERPL CALC-SCNC: 13 MMOL/L (ref 10–20)
AST SERPL W P-5'-P-CCNC: 56 U/L (ref 9–39)
BACTERIA UR CULT: NO GROWTH
BILIRUB SERPL-MCNC: 0.8 MG/DL (ref 0–1.2)
BUN SERPL-MCNC: 7 MG/DL (ref 6–23)
CALCIUM SERPL-MCNC: 8.2 MG/DL (ref 8.6–10.3)
CHLORIDE SERPL-SCNC: 103 MMOL/L (ref 98–107)
CO2 SERPL-SCNC: 23 MMOL/L (ref 21–32)
CREAT SERPL-MCNC: 0.41 MG/DL (ref 0.5–1.05)
EGFRCR SERPLBLD CKD-EPI 2021: >90 ML/MIN/1.73M*2
ERYTHROCYTE [DISTWIDTH] IN BLOOD BY AUTOMATED COUNT: 15.5 % (ref 11.5–14.5)
FERRITIN SERPL-MCNC: 483 NG/ML (ref 8–150)
FOLATE SERPL-MCNC: 3.4 NG/ML
GLUCOSE SERPL-MCNC: 74 MG/DL (ref 74–99)
HCT VFR BLD AUTO: 28.1 % (ref 36–46)
HGB BLD-MCNC: 8.7 G/DL (ref 12–16)
IRON SATN MFR SERPL: 13 % (ref 25–45)
IRON SERPL-MCNC: 37 UG/DL (ref 35–150)
MCH RBC QN AUTO: 28.2 PG (ref 26–34)
MCHC RBC AUTO-ENTMCNC: 31 G/DL (ref 32–36)
MCV RBC AUTO: 91 FL (ref 80–100)
NRBC BLD-RTO: 0 /100 WBCS (ref 0–0)
PLATELET # BLD AUTO: 456 X10*3/UL (ref 150–450)
POTASSIUM SERPL-SCNC: 3.6 MMOL/L (ref 3.5–5.3)
PROT SERPL-MCNC: 7 G/DL (ref 6.4–8.2)
RBC # BLD AUTO: 3.08 X10*6/UL (ref 4–5.2)
SODIUM SERPL-SCNC: 135 MMOL/L (ref 136–145)
TIBC SERPL-MCNC: 279 UG/DL (ref 240–445)
UIBC SERPL-MCNC: 242 UG/DL (ref 110–370)
VIT B12 SERPL-MCNC: 528 PG/ML (ref 211–911)
WBC # BLD AUTO: 10.2 X10*3/UL (ref 4.4–11.3)

## 2024-06-28 PROCEDURE — 2500000004 HC RX 250 GENERAL PHARMACY W/ HCPCS (ALT 636 FOR OP/ED)

## 2024-06-28 PROCEDURE — 36415 COLL VENOUS BLD VENIPUNCTURE: CPT

## 2024-06-28 PROCEDURE — 96376 TX/PRO/DX INJ SAME DRUG ADON: CPT

## 2024-06-28 PROCEDURE — 82728 ASSAY OF FERRITIN: CPT | Mod: AHULAB | Performed by: HOSPITALIST

## 2024-06-28 PROCEDURE — G0378 HOSPITAL OBSERVATION PER HR: HCPCS

## 2024-06-28 PROCEDURE — 83540 ASSAY OF IRON: CPT | Performed by: HOSPITALIST

## 2024-06-28 PROCEDURE — 99232 SBSQ HOSP IP/OBS MODERATE 35: CPT | Performed by: OBSTETRICS & GYNECOLOGY

## 2024-06-28 PROCEDURE — 2500000002 HC RX 250 W HCPCS SELF ADMINISTERED DRUGS (ALT 637 FOR MEDICARE OP, ALT 636 FOR OP/ED): Performed by: INTERNAL MEDICINE

## 2024-06-28 PROCEDURE — 2500000001 HC RX 250 WO HCPCS SELF ADMINISTERED DRUGS (ALT 637 FOR MEDICARE OP): Performed by: HOSPITALIST

## 2024-06-28 PROCEDURE — 84075 ASSAY ALKALINE PHOSPHATASE: CPT

## 2024-06-28 PROCEDURE — 2500000004 HC RX 250 GENERAL PHARMACY W/ HCPCS (ALT 636 FOR OP/ED): Performed by: INTERNAL MEDICINE

## 2024-06-28 PROCEDURE — 2500000004 HC RX 250 GENERAL PHARMACY W/ HCPCS (ALT 636 FOR OP/ED): Performed by: HOSPITALIST

## 2024-06-28 PROCEDURE — 85027 COMPLETE CBC AUTOMATED: CPT

## 2024-06-28 PROCEDURE — 99232 SBSQ HOSP IP/OBS MODERATE 35: CPT | Performed by: HOSPITALIST

## 2024-06-28 PROCEDURE — 2500000001 HC RX 250 WO HCPCS SELF ADMINISTERED DRUGS (ALT 637 FOR MEDICARE OP): Performed by: INTERNAL MEDICINE

## 2024-06-28 RX ORDER — POLYETHYLENE GLYCOL 3350 17 G/17G
17 POWDER, FOR SOLUTION ORAL 3 TIMES DAILY
Status: DISCONTINUED | OUTPATIENT
Start: 2024-06-28 | End: 2024-06-29

## 2024-06-28 SDOH — ECONOMIC STABILITY: FOOD INSECURITY: WITHIN THE PAST 12 MONTHS, THE FOOD YOU BOUGHT JUST DIDN'T LAST AND YOU DIDN'T HAVE MONEY TO GET MORE.: NEVER TRUE

## 2024-06-28 SDOH — ECONOMIC STABILITY: HOUSING INSECURITY: IN THE LAST 12 MONTHS, HOW MANY PLACES HAVE YOU LIVED?: 1

## 2024-06-28 SDOH — ECONOMIC STABILITY: INCOME INSECURITY: IN THE LAST 12 MONTHS, WAS THERE A TIME WHEN YOU WERE NOT ABLE TO PAY THE MORTGAGE OR RENT ON TIME?: NO

## 2024-06-28 SDOH — HEALTH STABILITY: MENTAL HEALTH: HOW OFTEN DO YOU HAVE A DRINK CONTAINING ALCOHOL?: NEVER

## 2024-06-28 SDOH — ECONOMIC STABILITY: INCOME INSECURITY: IN THE PAST 12 MONTHS, HAS THE ELECTRIC, GAS, OIL, OR WATER COMPANY THREATENED TO SHUT OFF SERVICE IN YOUR HOME?: NO

## 2024-06-28 SDOH — ECONOMIC STABILITY: TRANSPORTATION INSECURITY
IN THE PAST 12 MONTHS, HAS THE LACK OF TRANSPORTATION KEPT YOU FROM MEDICAL APPOINTMENTS OR FROM GETTING MEDICATIONS?: NO

## 2024-06-28 SDOH — ECONOMIC STABILITY: FOOD INSECURITY: WITHIN THE PAST 12 MONTHS, YOU WORRIED THAT YOUR FOOD WOULD RUN OUT BEFORE YOU GOT MONEY TO BUY MORE.: NEVER TRUE

## 2024-06-28 SDOH — ECONOMIC STABILITY: HOUSING INSECURITY
IN THE LAST 12 MONTHS, WAS THERE A TIME WHEN YOU DID NOT HAVE A STEADY PLACE TO SLEEP OR SLEPT IN A SHELTER (INCLUDING NOW)?: NO

## 2024-06-28 SDOH — HEALTH STABILITY: MENTAL HEALTH: HOW OFTEN DO YOU HAVE 6 OR MORE DRINKS ON ONE OCCASION?: NEVER

## 2024-06-28 SDOH — ECONOMIC STABILITY: TRANSPORTATION INSECURITY
IN THE PAST 12 MONTHS, HAS LACK OF TRANSPORTATION KEPT YOU FROM MEETINGS, WORK, OR FROM GETTING THINGS NEEDED FOR DAILY LIVING?: NO

## 2024-06-28 SDOH — ECONOMIC STABILITY: INCOME INSECURITY: HOW HARD IS IT FOR YOU TO PAY FOR THE VERY BASICS LIKE FOOD, HOUSING, MEDICAL CARE, AND HEATING?: NOT HARD AT ALL

## 2024-06-28 SDOH — HEALTH STABILITY: MENTAL HEALTH: HOW MANY STANDARD DRINKS CONTAINING ALCOHOL DO YOU HAVE ON A TYPICAL DAY?: PATIENT DOES NOT DRINK

## 2024-06-28 ASSESSMENT — LIFESTYLE VARIABLES
AUDIT-C TOTAL SCORE: 0
SKIP TO QUESTIONS 9-10: 1

## 2024-06-28 ASSESSMENT — PAIN SCALES - GENERAL
PAINLEVEL_OUTOF10: 7
PAINLEVEL_OUTOF10: 10 - WORST POSSIBLE PAIN
PAINLEVEL_OUTOF10: 7
PAINLEVEL_OUTOF10: 10 - WORST POSSIBLE PAIN
PAINLEVEL_OUTOF10: 8
PAINLEVEL_OUTOF10: 7
PAINLEVEL_OUTOF10: 8

## 2024-06-28 ASSESSMENT — PAIN - FUNCTIONAL ASSESSMENT
PAIN_FUNCTIONAL_ASSESSMENT: 0-10

## 2024-06-28 ASSESSMENT — COGNITIVE AND FUNCTIONAL STATUS - GENERAL
MOBILITY SCORE: 24
DAILY ACTIVITIY SCORE: 24

## 2024-06-28 ASSESSMENT — PAIN DESCRIPTION - LOCATION
LOCATION: ABDOMEN
LOCATION: VAGINA
LOCATION: ABDOMEN

## 2024-06-28 ASSESSMENT — ACTIVITIES OF DAILY LIVING (ADL): LACK_OF_TRANSPORTATION: NO

## 2024-06-28 NOTE — CARE PLAN
Problem: Nutrition  Goal: Less than 5 days NPO/clear liquids  Outcome: Progressing  Goal: Oral intake greater than 50%  Outcome: Progressing  Goal: Oral intake greater 75%  Outcome: Progressing  Goal: Consume prescribed supplement  Outcome: Progressing  Goal: Adequate PO fluid intake  Outcome: Progressing  Goal: Nutrition support goals are met within 48 hrs  Outcome: Progressing  Goal: Nutrition support is meeting 75% of nutrient needs  Outcome: Progressing  Goal: Tube feed tolerance  Outcome: Progressing  Goal: BG  mg/dL  Outcome: Progressing  Goal: Lab values WNL  Outcome: Progressing  Goal: Electrolytes WNL  Outcome: Progressing  Goal: Promote healing  Outcome: Progressing  Goal: Maintain stable weight  Outcome: Progressing  Goal: Reduce weight from edema/fluid  Outcome: Progressing  Goal: Gradual weight gain  Outcome: Progressing  Goal: Improve ostomy output  Outcome: Progressing     Problem: Pain - Adult  Goal: Verbalizes/displays adequate comfort level or baseline comfort level  Outcome: Progressing     Problem: Safety - Adult  Goal: Free from fall injury  Outcome: Progressing     Problem: Discharge Planning  Goal: Discharge to home or other facility with appropriate resources  Outcome: Progressing     Problem: Chronic Conditions and Co-morbidities  Goal: Patient's chronic conditions and co-morbidity symptoms are monitored and maintained or improved  Outcome: Progressing     Problem: Pain  Goal: Takes deep breaths with improved pain control throughout the shift  Outcome: Progressing  Goal: Turns in bed with improved pain control throughout the shift  Outcome: Progressing  Goal: Walks with improved pain control throughout the shift  Outcome: Progressing  Goal: Performs ADL's with improved pain control throughout shift  Outcome: Progressing  Goal: Participates in PT with improved pain control throughout the shift  Outcome: Progressing  Goal: Free from opioid side effects throughout the shift  Outcome:  Progressing  Goal: Free from acute confusion related to pain meds throughout the shift  Outcome: Progressing

## 2024-06-28 NOTE — PROGRESS NOTES
06/28/24 1017   Discharge Planning   Living Arrangements Children   Support Systems Children;Friends/neighbors;Family members;Spouse/significant other;Parent   Assistance Needed None   Type of Residence Private residence   Number of Stairs to Enter Residence 3   Number of Stairs Within Residence 0   Do you have animals or pets at home? Yes   Type of Animals or Pets one dog   Who is requesting discharge planning? Provider   Home or Post Acute Services None   Patient expects to be discharged to: home   Does the patient need discharge transport arranged? No   RoundTrip coordination needed? No   Financial Resource Strain   How hard is it for you to pay for the very basics like food, housing, medical care, and heating? Not hard   Housing Stability   In the last 12 months, was there a time when you were not able to pay the mortgage or rent on time? N   In the last 12 months, how many places have you lived? 1   In the last 12 months, was there a time when you did not have a steady place to sleep or slept in a shelter (including now)? N   Transportation Needs   In the past 12 months, has lack of transportation kept you from medical appointments or from getting medications? no   In the past 12 months, has lack of transportation kept you from meetings, work, or from getting things needed for daily living? No     6/28/24 1018  Met with patient at bedside to discuss discharge planning.  Patient lives at home with her four children in an apartment.   It is on the first floor.  She is independent with ADLs and drives at baseline.  She does not use any assistive devices for ambulation.  She plans on returning home at discharge and does not anticipate any discharge needs.  She will notify the [facility] should any needs arise.  Anticipate discharge today.  Ely Castro RN [facility]

## 2024-06-28 NOTE — PROGRESS NOTES
PT SEEN.  L bartholins cyst may have re expanded a bit, but ~ 50% size of yesterday.  Disc how these can recur commonly, and encouraged warm soaks/compresses 4-5x/day.  Not a reason to maintain admission if otherwise stable for discharge, but will see while she is here.  Disc f/up in office ~ 1-2 weeks after discharge or as needed

## 2024-06-28 NOTE — PROGRESS NOTES
Between 7AM-7PM please message me via Epic Secure Chat.  After 7PM please page No[facility]urnist on call.    [facility] Hospitalist Progress Note      Deloris Mcfadden    :  1985(39 y.o.)    MRN:  [id number]  Date: 24     Assessment and Plan:     Post operative hematoma  - status post laparoscopic cholecystectomy 2024 secondary to gallstone pancreatitis  - HIDA scan negative for bilary leak  - CT scan shows fluid colelction in gallbladder fossa. Surgery consulted and suspects post surgical changes/hematoma, less likely abscess or biloma.     Left Bartholin's abscess  - GYN consulted; status post incision and drainage.  - Refusing sitz bath as recommended by GYN; encouraged her again to attempt these to help with local edema. GYN to re-evaluate today  - remains on IV zosyn, will likely transition to PO abx on dc  - continue pain control    HLD  - continue statin    WHITEHEAD  - continue op follow up with GI    Normocytic anemia  - checking iron studies, ferritin, b12, folate    DVT Prophylaxis: SCDs    Disposition: continue to monitor inpatient, await consultant recommendations, await test results, and await clinical improvement    Electronically signed by Noam Hdz DO on 24 at 1:55 PM     Subjective:      Interval History:   Vitals and chart notes from overnight reviewed.   No acute issues overnight.   Patient seen and evaluated at bedside.   Still with pain/edema in left labia. Requests repeat eval by GYN as she feels the area was not fully drained.    Review of Systems:   Other than patient's chronic conditions and those complaints in the history above, the rest of the 10 systems review were done and were negative.     Current medications:  Scheduled Meds:acetaminophen, 650 mg, oral, TID  pantoprazole, 40 mg, oral, Daily before breakfast   Or  pantoprazole, 40 mg, intravenous, Daily before breakfast  piperacillin-tazobactam, 3.375 g, intravenous, q6h  polyethylene glycol, 17 g, oral,  Daily  polyethylene glycol, 17 g, oral, TID  rosuvastatin, 20 mg, oral, Daily      Continuous Infusions:   PRN Meds:PRN medications: HYDROmorphone, ondansetron **OR** ondansetron, oxyCODONE      Objective:     Heart Rate:  [68-81]   Temp:  [36.4 °C (97.5 °F)-36.8 °C (98.2 °F)]   Resp:  [16]   BP: (121-168)/()   SpO2:  [99 %-100 %]          Physical Exam  Vitals and nursing note reviewed.   HENT:      Mouth/Throat:      Mouth: Mucous membranes are moist.      Pharynx: Oropharynx is clear.   Cardiovascular:      Rate and Rhythm: Normal rate and regular rhythm.   Pulmonary:      Effort: Pulmonary effort is normal.   Abdominal:      Palpations: Abdomen is soft.      Comments: Ecchymosis on bilateral lower abdomen, no peritoneal signs   Neurological:      Mental Status: She is alert and oriented to person, place, and time.         Labs:   Lab Results   Component Value Date     (L) 06/28/2024    K 3.6 06/28/2024     06/28/2024    CO2 23 06/28/2024    BUN 7 06/28/2024    CREATININE 0.41 (L) 06/28/2024    GLUCOSE 74 06/28/2024    CALCIUM 8.2 (L) 06/28/2024    PROT 7.0 06/28/2024    BILITOT 0.8 06/28/2024    ALKPHOS 125 (H) 06/28/2024    AST 56 (H) 06/28/2024    ALT 53 (H) 06/28/2024       Lab Results   Component Value Date    WBC 10.2 06/28/2024    HGB 8.7 (L) 06/28/2024    HCT 28.1 (L) 06/28/2024    MCV 91 06/28/2024     (H) 06/28/2024

## 2024-06-28 NOTE — PROGRESS NOTES
Deloris Mcfadden is a 39 y.o. female on day 1 of admission presenting with Postoperative hematoma involving digestive system following digestive system procedure.    Assessment/Plan   Patient's over 2 weeks out from gallbladder surgery.  She has a postoperative hematoma.  HIDA scan negative for leak.  No plans for surgical intervention.  Diet as tolerated.    Subjective   Having discomfort related to Bartholin cyst I&D yesterday       Objective     Physical Exam  NAD  A&Ox3  Non icteric  CTA  RR  Abdomen soft min tender. Wounds clean, intact  Extremities warm, well perfused     Last Recorded Vitals  Blood pressure (!) 168/109, pulse 71, temperature 36.8 °C (98.2 °F), temperature source Temporal, resp. rate 16, height 1.524 m (5'), weight 68 kg (150 lb), SpO2 99%.  Intake/Output last 3 Shifts:  I/O last 3 completed shifts:  In: 1980 (29.1 mL/kg) [P.O.:720; I.V.:1060 (15.6 mL/kg); IV Piggyback:200]  Out: - (0 mL/kg)   Weight: 68 kg     Relevant Results    Scheduled medications  acetaminophen, 650 mg, oral, TID  pantoprazole, 40 mg, oral, Daily before breakfast   Or  pantoprazole, 40 mg, intravenous, Daily before breakfast  piperacillin-tazobactam, 3.375 g, intravenous, q6h  polyethylene glycol, 17 g, oral, Daily  polyethylene glycol, 17 g, oral, TID  rosuvastatin, 20 mg, oral, Daily      Continuous medications     PRN medications  PRN medications: HYDROmorphone, ondansetron **OR** ondansetron, oxyCODONE    Results for orders placed or performed during the hospital encounter of 06/26/24 (from the past 24 hour(s))   CBC   Result Value Ref Range    WBC 10.2 4.4 - 11.3 x10*3/uL    nRBC 0.0 0.0 - 0.0 /100 WBCs    RBC 3.08 (L) 4.00 - 5.20 x10*6/uL    Hemoglobin 8.7 (L) 12.0 - 16.0 g/dL    Hematocrit 28.1 (L) 36.0 - 46.0 %    MCV 91 80 - 100 fL    MCH 28.2 26.0 - 34.0 pg    MCHC 31.0 (L) 32.0 - 36.0 g/dL    RDW 15.5 (H) 11.5 - 14.5 %    Platelets 456 (H) 150 - 450 x10*3/uL   Comprehensive Metabolic Panel   Result Value Ref  Range    Glucose 74 74 - 99 mg/dL    Sodium 135 (L) 136 - 145 mmol/L    Potassium 3.6 3.5 - 5.3 mmol/L    Chloride 103 98 - 107 mmol/L    Bicarbonate 23 21 - 32 mmol/L    Anion Gap 13 10 - 20 mmol/L    Urea Nitrogen 7 6 - 23 mg/dL    Creatinine 0.41 (L) 0.50 - 1.05 mg/dL    eGFR >90 >60 mL/min/1.73m*2    Calcium 8.2 (L) 8.6 - 10.3 mg/dL    Albumin 3.0 (L) 3.4 - 5.0 g/dL    Alkaline Phosphatase 125 (H) 33 - 110 U/L    Total Protein 7.0 6.4 - 8.2 g/dL    AST 56 (H) 9 - 39 U/L    Bilirubin, Total 0.8 0.0 - 1.2 mg/dL    ALT 53 (H) 7 - 45 U/L   Iron and TIBC   Result Value Ref Range    Iron 37 35 - 150 ug/dL    UIBC 242 110 - 370 ug/dL    TIBC 279 240 - 445 ug/dL    % Saturation 13 (L) 25 - 45 %           I spent 25 minutes in the professional and overall care of this patient.      Reginald Harden MD

## 2024-06-29 ENCOUNTER — PHARMACY VISIT (OUTPATIENT)
Dept: PHARMACY | Facility: CLINIC | Age: 39
End: 2024-06-29
Payer: MEDICARE

## 2024-06-29 VITALS
BODY MASS INDEX: 29.45 KG/M2 | HEIGHT: 60 IN | RESPIRATION RATE: 16 BRPM | HEART RATE: 92 BPM | TEMPERATURE: 98.6 F | SYSTOLIC BLOOD PRESSURE: 170 MMHG | OXYGEN SATURATION: 100 % | DIASTOLIC BLOOD PRESSURE: 99 MMHG | WEIGHT: 150 LBS

## 2024-06-29 PROCEDURE — 2500000004 HC RX 250 GENERAL PHARMACY W/ HCPCS (ALT 636 FOR OP/ED): Performed by: HOSPITALIST

## 2024-06-29 PROCEDURE — 2500000004 HC RX 250 GENERAL PHARMACY W/ HCPCS (ALT 636 FOR OP/ED)

## 2024-06-29 PROCEDURE — 10060 I&D ABSCESS SIMPLE/SINGLE: CPT | Performed by: OBSTETRICS & GYNECOLOGY

## 2024-06-29 PROCEDURE — 2500000004 HC RX 250 GENERAL PHARMACY W/ HCPCS (ALT 636 FOR OP/ED): Performed by: INTERNAL MEDICINE

## 2024-06-29 PROCEDURE — RXMED WILLOW AMBULATORY MEDICATION CHARGE

## 2024-06-29 PROCEDURE — 96376 TX/PRO/DX INJ SAME DRUG ADON: CPT | Mod: 59

## 2024-06-29 PROCEDURE — 2500000001 HC RX 250 WO HCPCS SELF ADMINISTERED DRUGS (ALT 637 FOR MEDICARE OP): Performed by: INTERNAL MEDICINE

## 2024-06-29 PROCEDURE — G0378 HOSPITAL OBSERVATION PER HR: HCPCS

## 2024-06-29 PROCEDURE — 2500000005 HC RX 250 GENERAL PHARMACY W/O HCPCS: Performed by: OBSTETRICS & GYNECOLOGY

## 2024-06-29 PROCEDURE — 2500000001 HC RX 250 WO HCPCS SELF ADMINISTERED DRUGS (ALT 637 FOR MEDICARE OP): Performed by: HOSPITALIST

## 2024-06-29 PROCEDURE — 2500000002 HC RX 250 W HCPCS SELF ADMINISTERED DRUGS (ALT 637 FOR MEDICARE OP, ALT 636 FOR OP/ED): Performed by: INTERNAL MEDICINE

## 2024-06-29 PROCEDURE — 99239 HOSP IP/OBS DSCHRG MGMT >30: CPT | Performed by: HOSPITALIST

## 2024-06-29 RX ORDER — ACETAMINOPHEN 325 MG/1
650 TABLET ORAL 3 TIMES DAILY
Qty: 90 TABLET | Refills: 0 | Status: SHIPPED | OUTPATIENT
Start: 2024-06-29 | End: 2024-07-14

## 2024-06-29 RX ORDER — AMOXICILLIN 250 MG
1 CAPSULE ORAL 2 TIMES DAILY
Qty: 60 TABLET | Refills: 0 | Status: SHIPPED | OUTPATIENT
Start: 2024-06-29 | End: 2024-07-29

## 2024-06-29 RX ORDER — MAGNESIUM HYDROXIDE 2400 MG/10ML
10 SUSPENSION ORAL ONCE
Status: COMPLETED | OUTPATIENT
Start: 2024-06-29 | End: 2024-06-29

## 2024-06-29 RX ORDER — OXYCODONE HYDROCHLORIDE 5 MG/1
5 TABLET ORAL EVERY 6 HOURS PRN
Qty: 15 TABLET | Refills: 0 | Status: SHIPPED | OUTPATIENT
Start: 2024-06-29 | End: 2024-07-04

## 2024-06-29 RX ORDER — LIDOCAINE HYDROCHLORIDE 10 MG/ML
20 INJECTION INFILTRATION; PERINEURAL ONCE
Status: COMPLETED | OUTPATIENT
Start: 2024-06-29 | End: 2024-06-29

## 2024-06-29 RX ORDER — POLYETHYLENE GLYCOL 3350 17 G/17G
17 POWDER, FOR SOLUTION ORAL DAILY
Qty: 30 PACKET | Refills: 0 | Status: SHIPPED | OUTPATIENT
Start: 2024-06-29 | End: 2024-07-29

## 2024-06-29 RX ORDER — SULFAMETHOXAZOLE AND TRIMETHOPRIM 800; 160 MG/1; MG/1
1 TABLET ORAL 2 TIMES DAILY
Qty: 10 TABLET | Refills: 0 | Status: SHIPPED | OUTPATIENT
Start: 2024-06-29 | End: 2024-07-04

## 2024-06-29 ASSESSMENT — PAIN SCALES - GENERAL
PAINLEVEL_OUTOF10: 8
PAINLEVEL_OUTOF10: 6
PAINLEVEL_OUTOF10: 7
PAINLEVEL_OUTOF10: 6
PAINLEVEL_OUTOF10: 6

## 2024-06-29 ASSESSMENT — PAIN - FUNCTIONAL ASSESSMENT
PAIN_FUNCTIONAL_ASSESSMENT: 0-10

## 2024-06-29 NOTE — NURSING NOTE
Discharge instructions provided using teach back method. Pt's health related  risk factors discussed with pt. pt educated to look for any worsening sign and symptoms. Pt educated to seek medical attention if experience any medical emergency. Pt aware to follow up with outpatient clinics as scheduled. Home going meds reviewed with pt. Pt verbalized understanding of disposition and discharge instructions. All questions answered to patient's satisfaction and within nursing scope of practice. Vitals stable, except /99, Dr. Hdz informed at bedside. Doc stated it is likely due to pain. IV removed.

## 2024-06-29 NOTE — CARE PLAN
The patient's goals for the shift include      The clinical goals for the shift include will remain safe      Problem: Nutrition  Goal: Less than 5 days NPO/clear liquids  Outcome: Progressing  Goal: Oral intake greater than 50%  Outcome: Progressing  Goal: Oral intake greater 75%  Outcome: Progressing  Goal: Consume prescribed supplement  Outcome: Progressing  Goal: Adequate PO fluid intake  Outcome: Progressing  Goal: Nutrition support goals are met within 48 hrs  Outcome: Progressing  Goal: Nutrition support is meeting 75% of nutrient needs  Outcome: Progressing  Goal: Tube feed tolerance  Outcome: Progressing  Goal: BG  mg/dL  Outcome: Progressing  Goal: Lab values WNL  Outcome: Progressing  Goal: Electrolytes WNL  Outcome: Progressing  Goal: Promote healing  Outcome: Progressing  Goal: Maintain stable weight  Outcome: Progressing  Goal: Reduce weight from edema/fluid  Outcome: Progressing  Goal: Gradual weight gain  Outcome: Progressing  Goal: Improve ostomy output  Outcome: Progressing     Problem: Pain - Adult  Goal: Verbalizes/displays adequate comfort level or baseline comfort level  Outcome: Progressing     Problem: Safety - Adult  Goal: Free from fall injury  Outcome: Progressing     Problem: Discharge Planning  Goal: Discharge to home or other facility with appropriate resources  Outcome: Progressing     Problem: Chronic Conditions and Co-morbidities  Goal: Patient's chronic conditions and co-morbidity symptoms are monitored and maintained or improved  Outcome: Progressing     Problem: Pain  Goal: Takes deep breaths with improved pain control throughout the shift  Outcome: Progressing  Goal: Turns in bed with improved pain control throughout the shift  Outcome: Progressing  Goal: Walks with improved pain control throughout the shift  Outcome: Progressing  Goal: Performs ADL's with improved pain control throughout shift  Outcome: Progressing  Goal: Participates in PT with improved pain control  throughout the shift  Outcome: Progressing  Goal: Free from opioid side effects throughout the shift  Outcome: Progressing  Goal: Free from acute confusion related to pain meds throughout the shift  Outcome: Progressing

## 2024-06-29 NOTE — POST-PROCEDURE NOTE
Patient is being discharged today.  Patient notes that she still has some induration and possible lump left labia.  Discussed option of reincision and drainage.  Patient would like to pursue.  Verbal consent obtained.    Medial aspect of left minora cleansed with Betadine and infiltrated with 1 cc of 1% lidocaine.  Incision and drainage made with scalpel and purulent material returned.  Much less then 2 days ago.  Even when that is fully drained there is still some induration in the area.  Discussed this with patient that she could expect that and it may take a week or 2 for that to resolve.  Also discussed importance of frequent soaking throughout the day to promote healing.  Patient will do this at home.  Patient tolerated procedure well.  No complications.    Discussed with Dr. Hdz and she will be discharged home with 1 week of Bactrim.  Can follow-up with me in the office.

## 2024-07-01 NOTE — DISCHARGE SUMMARY
Discharge Diagnosis  Postoperative hematoma  Left Bartholin's abscess   Constipation  HLD  WHITEHEAD  Normocytic anemia  Elevated BP reading without hx of HTN    Issues Requiring Follow-Up  - OP follow up with surgery, gyn  - Monitor home BP log    Discharge Meds     Your medication list        START taking these medications        Instructions Last Dose Given Next Dose Due   acetaminophen 325 mg tablet  Commonly known as: Tylenol      Take 2 tablets (650 mg) by mouth 3 times a day for 15 days.       oxyCODONE 5 mg immediate release tablet  Commonly known as: Roxicodone      Take 1 tablet (5 mg) by mouth every 6 hours if needed for severe pain (7 - 10) for up to 5 days.       sennosides-docusate sodium 8.6-50 mg tablet  Commonly known as: Dunia-Colace      Take 1 tablet by mouth 2 times a day.       sulfamethoxazole-trimethoprim 800-160 mg tablet  Commonly known as: Bactrim DS      Take 1 tablet by mouth 2 times a day for 5 days.              CONTINUE taking these medications        Instructions Last Dose Given Next Dose Due   polyethylene glycol 17 gram packet  Commonly known as: Glycolax, Miralax      Mix 1 packet (17 g) in 8 ounces of liquid and drink by mouth once daily.       rosuvastatin 20 mg tablet  Commonly known as: Crestor                  STOP taking these medications      ondansetron 4 mg tablet  Commonly known as: Zofran        oxyCODONE-acetaminophen 5-325 mg tablet  Commonly known as: Percocet                  Where to Get Your Medications        These medications were sent to [facility]  [address], [facility] [address]      Hours: 8 AM to 6 PM Mon-Fri, 9 AM to 1 PM Saturday Phone: [phone]   acetaminophen 325 mg tablet  oxyCODONE 5 mg immediate release tablet  polyethylene glycol 17 gram packet  sennosides-docusate sodium 8.6-50 mg tablet  sulfamethoxazole-trimethoprim 800-160 mg tablet         Test Results Pending At Discharge  Pending Labs       Order Current Status     Extra Urine Gray Tube Collected (06/27/24 1050)    Urinalysis with Reflex Culture and Microscopic In process            Hospital Course  39 y.o. female with a past medical history of hyperlipidemia, WHITEHEAD, alcohol use disorder, who presented to [facility] ER complaining of abdominal pain.  She had gallstone pancreatitis on 6/12/2024 and underwent a laparoscopic cholecystectomy with Dr. Harden.  She tolerated the procedure well and was discharged home.  The patient endorses that around 3 days after the surgery she began having persistent generalized abdominal pain as well as ecchymosis involving her entire abdomen. Upon admission she also reported pain and swelling of left labia.     Post operative hematoma  - status post laparoscopic cholecystectomy 6/12/2024 secondary to gallstone pancreatitis  - HIDA scan negative for bilary leak  - CT scan shows fluid colelction in gallbladder fossa. Surgery consulted and suspects post surgical changes/hematoma, less likely abscess or biloma.      Left Bartholin's abscess  - GYN consulted; status post incision and drainage x2 while admitted  - Will need frequent soaking at home; op follow up with GYN. Discussed with Dr. Lam plan to transition to PO bactrim on dc     Constipation  - senna bid and daily miralax    HLD  - continue statin     WHITEHEAD  - continue op follow up with GI     Normocytic anemia    Pertinent Physical Exam At Time of Discharge  Physical Exam  Vitals and nursing note reviewed.   HENT:      Mouth/Throat:      Mouth: Mucous membranes are moist.      Pharynx: Oropharynx is clear.   Cardiovascular:      Rate and Rhythm: Normal rate and regular rhythm.   Pulmonary:      Effort: Pulmonary effort is normal. No respiratory distress.   Abdominal:      General: There is no distension.      Palpations: Abdomen is soft.      Tenderness: There is no abdominal tenderness.      Comments: Bilateral ecchymosis in lower quadrants of abdomen   Neurological:      Mental  Status: She is alert and oriented to person, place, and time.         Outpatient Follow-Up  No future appointments.    DISCHARGE TIME: > 30 minutes      Noam Hdz DO

## 2024-07-23 ENCOUNTER — TELEPHONE (OUTPATIENT)
Dept: SURGERY | Facility: CLINIC | Age: 39
End: 2024-07-23
Payer: COMMERCIAL

## 2024-07-23 NOTE — TELEPHONE ENCOUNTER
Patient called requesting a refill on pain medication.  Surgery was 6/12/2024.Informed patient that she will need a postoperative visit. Appointment was made. She will take ibuprofen or tylenol as needed.

## 2025-05-29 ENCOUNTER — APPOINTMENT (OUTPATIENT)
Dept: RADIOLOGY | Facility: HOSPITAL | Age: 40
End: 2025-05-29
Payer: COMMERCIAL

## 2025-05-29 ENCOUNTER — HOSPITAL ENCOUNTER (EMERGENCY)
Facility: HOSPITAL | Age: 40
Discharge: HOME | End: 2025-05-29
Attending: EMERGENCY MEDICINE
Payer: COMMERCIAL

## 2025-05-29 ENCOUNTER — HOSPITAL ENCOUNTER (OUTPATIENT)
Dept: CARDIOLOGY | Facility: HOSPITAL | Age: 40
Discharge: HOME | End: 2025-05-29
Payer: COMMERCIAL

## 2025-05-29 VITALS
TEMPERATURE: 97.7 F | BODY MASS INDEX: 30.43 KG/M2 | DIASTOLIC BLOOD PRESSURE: 86 MMHG | HEART RATE: 65 BPM | RESPIRATION RATE: 17 BRPM | HEIGHT: 60 IN | OXYGEN SATURATION: 100 % | SYSTOLIC BLOOD PRESSURE: 135 MMHG | WEIGHT: 155 LBS

## 2025-05-29 DIAGNOSIS — R10.11 RIGHT UPPER QUADRANT ABDOMINAL PAIN: Primary | ICD-10-CM

## 2025-05-29 LAB
ALBUMIN SERPL BCP-MCNC: 4.9 G/DL (ref 3.4–5)
ALP SERPL-CCNC: 113 U/L (ref 33–110)
ALT SERPL W P-5'-P-CCNC: 181 U/L (ref 7–45)
ANION GAP SERPL CALC-SCNC: 24 MMOL/L (ref 10–20)
APPEARANCE UR: CLEAR
AST SERPL W P-5'-P-CCNC: 128 U/L (ref 9–39)
BACTERIA #/AREA URNS AUTO: ABNORMAL /HPF
BASOPHILS # BLD AUTO: 0.03 X10*3/UL (ref 0–0.1)
BASOPHILS NFR BLD AUTO: 0.5 %
BILIRUB SERPL-MCNC: 1.1 MG/DL (ref 0–1.2)
BILIRUB UR STRIP.AUTO-MCNC: ABNORMAL MG/DL
BUN SERPL-MCNC: 12 MG/DL (ref 6–23)
CALCIUM SERPL-MCNC: 9.6 MG/DL (ref 8.6–10.3)
CHLORIDE SERPL-SCNC: 90 MMOL/L (ref 98–107)
CO2 SERPL-SCNC: 24 MMOL/L (ref 21–32)
COLOR UR: YELLOW
CREAT SERPL-MCNC: 0.67 MG/DL (ref 0.5–1.05)
EGFRCR SERPLBLD CKD-EPI 2021: >90 ML/MIN/1.73M*2
EOSINOPHIL # BLD AUTO: 0.01 X10*3/UL (ref 0–0.7)
EOSINOPHIL NFR BLD AUTO: 0.2 %
ERYTHROCYTE [DISTWIDTH] IN BLOOD BY AUTOMATED COUNT: 14.5 % (ref 11.5–14.5)
GLUCOSE SERPL-MCNC: 76 MG/DL (ref 74–99)
GLUCOSE UR STRIP.AUTO-MCNC: NORMAL MG/DL
HCT VFR BLD AUTO: 41.6 % (ref 36–46)
HGB BLD-MCNC: 14.6 G/DL (ref 12–16)
IMM GRANULOCYTES # BLD AUTO: 0.04 X10*3/UL (ref 0–0.7)
IMM GRANULOCYTES NFR BLD AUTO: 0.6 % (ref 0–0.9)
KETONES UR STRIP.AUTO-MCNC: ABNORMAL MG/DL
LACTATE SERPL-SCNC: 1 MMOL/L (ref 0.4–2)
LEUKOCYTE ESTERASE UR QL STRIP.AUTO: NEGATIVE
LIPASE SERPL-CCNC: 72 U/L (ref 9–82)
LYMPHOCYTES # BLD AUTO: 2.49 X10*3/UL (ref 1.2–4.8)
LYMPHOCYTES NFR BLD AUTO: 40 %
MCH RBC QN AUTO: 26.5 PG (ref 26–34)
MCHC RBC AUTO-ENTMCNC: 35.1 G/DL (ref 32–36)
MCV RBC AUTO: 76 FL (ref 80–100)
MONOCYTES # BLD AUTO: 0.33 X10*3/UL (ref 0.1–1)
MONOCYTES NFR BLD AUTO: 5.3 %
MUCOUS THREADS #/AREA URNS AUTO: ABNORMAL /LPF
NEUTROPHILS # BLD AUTO: 3.33 X10*3/UL (ref 1.2–7.7)
NEUTROPHILS NFR BLD AUTO: 53.4 %
NITRITE UR QL STRIP.AUTO: NEGATIVE
NRBC BLD-RTO: 0 /100 WBCS (ref 0–0)
PH UR STRIP.AUTO: 7 [PH]
PLATELET # BLD AUTO: 248 X10*3/UL (ref 150–450)
POTASSIUM SERPL-SCNC: 3.7 MMOL/L (ref 3.5–5.3)
PROT SERPL-MCNC: 9 G/DL (ref 6.4–8.2)
PROT UR STRIP.AUTO-MCNC: ABNORMAL MG/DL
RBC # BLD AUTO: 5.51 X10*6/UL (ref 4–5.2)
RBC # UR STRIP.AUTO: NEGATIVE MG/DL
RBC #/AREA URNS AUTO: ABNORMAL /HPF
SODIUM SERPL-SCNC: 134 MMOL/L (ref 136–145)
SP GR UR STRIP.AUTO: >1.05
SQUAMOUS #/AREA URNS AUTO: ABNORMAL /HPF
UROBILINOGEN UR STRIP.AUTO-MCNC: ABNORMAL MG/DL
WBC # BLD AUTO: 6.2 X10*3/UL (ref 4.4–11.3)
WBC #/AREA URNS AUTO: ABNORMAL /HPF

## 2025-05-29 PROCEDURE — 99285 EMERGENCY DEPT VISIT HI MDM: CPT | Mod: 25 | Performed by: EMERGENCY MEDICINE

## 2025-05-29 PROCEDURE — 96374 THER/PROPH/DIAG INJ IV PUSH: CPT | Mod: 59

## 2025-05-29 PROCEDURE — 96375 TX/PRO/DX INJ NEW DRUG ADDON: CPT

## 2025-05-29 PROCEDURE — 74177 CT ABD & PELVIS W/CONTRAST: CPT

## 2025-05-29 PROCEDURE — 96376 TX/PRO/DX INJ SAME DRUG ADON: CPT

## 2025-05-29 PROCEDURE — 93005 ELECTROCARDIOGRAM TRACING: CPT

## 2025-05-29 PROCEDURE — 83605 ASSAY OF LACTIC ACID: CPT | Performed by: NURSE PRACTITIONER

## 2025-05-29 PROCEDURE — 83690 ASSAY OF LIPASE: CPT | Performed by: NURSE PRACTITIONER

## 2025-05-29 PROCEDURE — 80053 COMPREHEN METABOLIC PANEL: CPT | Performed by: NURSE PRACTITIONER

## 2025-05-29 PROCEDURE — 2500000004 HC RX 250 GENERAL PHARMACY W/ HCPCS (ALT 636 FOR OP/ED): Mod: JZ | Performed by: EMERGENCY MEDICINE

## 2025-05-29 PROCEDURE — 81003 URINALYSIS AUTO W/O SCOPE: CPT | Performed by: NURSE PRACTITIONER

## 2025-05-29 PROCEDURE — 36415 COLL VENOUS BLD VENIPUNCTURE: CPT | Performed by: NURSE PRACTITIONER

## 2025-05-29 PROCEDURE — 74177 CT ABD & PELVIS W/CONTRAST: CPT | Mod: FOREIGN READ | Performed by: RADIOLOGY

## 2025-05-29 PROCEDURE — 85025 COMPLETE CBC W/AUTO DIFF WBC: CPT | Performed by: NURSE PRACTITIONER

## 2025-05-29 PROCEDURE — 2500000004 HC RX 250 GENERAL PHARMACY W/ HCPCS (ALT 636 FOR OP/ED): Performed by: NURSE PRACTITIONER

## 2025-05-29 PROCEDURE — 2550000001 HC RX 255 CONTRASTS: Performed by: EMERGENCY MEDICINE

## 2025-05-29 RX ORDER — TRAMADOL HYDROCHLORIDE 50 MG/1
50 TABLET, FILM COATED ORAL EVERY 6 HOURS PRN
Qty: 12 TABLET | Refills: 0 | Status: SHIPPED | OUTPATIENT
Start: 2025-05-29 | End: 2025-06-01

## 2025-05-29 RX ORDER — MORPHINE SULFATE 4 MG/ML
4 INJECTION, SOLUTION INTRAMUSCULAR; INTRAVENOUS ONCE
Status: COMPLETED | OUTPATIENT
Start: 2025-05-29 | End: 2025-05-29

## 2025-05-29 RX ORDER — PANTOPRAZOLE SODIUM 40 MG/10ML
40 INJECTION, POWDER, LYOPHILIZED, FOR SOLUTION INTRAVENOUS ONCE
Status: COMPLETED | OUTPATIENT
Start: 2025-05-29 | End: 2025-05-29

## 2025-05-29 RX ORDER — ONDANSETRON HYDROCHLORIDE 2 MG/ML
4 INJECTION, SOLUTION INTRAVENOUS ONCE
Status: COMPLETED | OUTPATIENT
Start: 2025-05-29 | End: 2025-05-29

## 2025-05-29 RX ADMIN — ONDANSETRON 4 MG: 2 INJECTION, SOLUTION INTRAMUSCULAR; INTRAVENOUS at 14:51

## 2025-05-29 RX ADMIN — MORPHINE SULFATE 4 MG: 4 INJECTION, SOLUTION INTRAMUSCULAR; INTRAVENOUS at 14:51

## 2025-05-29 RX ADMIN — IOHEXOL 75 ML: 350 INJECTION, SOLUTION INTRAVENOUS at 16:40

## 2025-05-29 RX ADMIN — MORPHINE SULFATE 4 MG: 4 INJECTION, SOLUTION INTRAMUSCULAR; INTRAVENOUS at 19:55

## 2025-05-29 RX ADMIN — MORPHINE SULFATE 4 MG: 4 INJECTION, SOLUTION INTRAMUSCULAR; INTRAVENOUS at 18:14

## 2025-05-29 RX ADMIN — PANTOPRAZOLE SODIUM 40 MG: 40 INJECTION, POWDER, FOR SOLUTION INTRAVENOUS at 14:52

## 2025-05-29 RX ADMIN — SODIUM CHLORIDE 1000 ML: 0.9 INJECTION, SOLUTION INTRAVENOUS at 14:53

## 2025-05-29 ASSESSMENT — PAIN SCALES - GENERAL
PAINLEVEL_OUTOF10: 10 - WORST POSSIBLE PAIN
PAINLEVEL_OUTOF10: 7
PAINLEVEL_OUTOF10: 8
PAINLEVEL_OUTOF10: 10 - WORST POSSIBLE PAIN
PAINLEVEL_OUTOF10: 10 - WORST POSSIBLE PAIN

## 2025-05-29 ASSESSMENT — COLUMBIA-SUICIDE SEVERITY RATING SCALE - C-SSRS
1. IN THE PAST MONTH, HAVE YOU WISHED YOU WERE DEAD OR WISHED YOU COULD GO TO SLEEP AND NOT WAKE UP?: NO
6. HAVE YOU EVER DONE ANYTHING, STARTED TO DO ANYTHING, OR PREPARED TO DO ANYTHING TO END YOUR LIFE?: NO
2. HAVE YOU ACTUALLY HAD ANY THOUGHTS OF KILLING YOURSELF?: NO

## 2025-05-29 ASSESSMENT — PAIN DESCRIPTION - DESCRIPTORS
DESCRIPTORS: ACHING

## 2025-05-29 ASSESSMENT — PAIN - FUNCTIONAL ASSESSMENT
PAIN_FUNCTIONAL_ASSESSMENT: 0-10

## 2025-05-29 ASSESSMENT — PAIN DESCRIPTION - LOCATION
LOCATION: ABDOMEN
LOCATION: BACK
LOCATION: ABDOMEN
LOCATION: BACK

## 2025-05-29 ASSESSMENT — PAIN DESCRIPTION - ORIENTATION
ORIENTATION: RIGHT
ORIENTATION: RIGHT;UPPER

## 2025-05-29 ASSESSMENT — PAIN DESCRIPTION - PROGRESSION: CLINICAL_PROGRESSION: GRADUALLY IMPROVING

## 2025-05-29 ASSESSMENT — PAIN DESCRIPTION - PAIN TYPE: TYPE: ACUTE PAIN

## 2025-05-29 NOTE — ED TRIAGE NOTES
TRIAGE NOTE   I saw the patient as the Clinician in Triage and performed a brief history and physical exam, established acuity, and ordered appropriate tests to develop basic plan of care. Patient will be seen by an [facility], resident and/or physician who will independently evaluate the patient. Please see subsequent provider notes for further details and disposition.     Brief HPI: In brief, Deloris Mcfadden is a 40 y.o. female that presents for abdominal pain in the left upper quadrant and right upper quadrant wrapping around to her right flank.  She had a CT June 2024 with no acute findings.  She has a history of a cholecystectomy.  She endorses fever and chills.  She denies chest pain or dyspnea.  She endorses nausea.  She denies change in urination or dysuria.  She endorses diarrhea.  She denies any recent trauma or fall.  Her vital signs were stable in triage but she was hypertensive..     Focused Physical exam:   Positive right upper quadrant and left upper quadrant pain no pain to the left lower or right lower quadrant.  Negative McBurney or psoas.  Normal bowel sounds.  Normal S1-S2 and rate.  Skin appears to be normal in temperature and color.  No lower extremity edema.  No ecchymosis no Barney's sign.  Plan/MDM:   CT abdomen pelvis with basic labs she received morphine and Zofran.  Urinalysis ordered.  Please see subsequent provider note for further details and disposition

## 2025-05-29 NOTE — ED PROVIDER NOTES
Emergency Department Provider Note       History of Present Illness     History provided by: Patient  Limitations to History: None  External Records Reviewed with Brief Summary: Records show a history of hypertension, migraines, cholecystectomy    HPI:  Deloris Mcfadden is a 40 y.o. female who presents emergency department with right-sided abdominal pain.  She reports the pain in the right back and right upper quadrant.  She reports mild nausea and no vomiting.  She denies fevers and chills.  She denies chest pain.    Physical Exam   Triage vitals:  T 35.7 °C (96.3 °F)  HR 84  BP (!) 160/100  RR 18  O2 100 % None (Room air)    Physical Exam  Vitals and nursing note reviewed.   HENT:      Head: Normocephalic and atraumatic.      Nose: Nose normal.   Eyes:      Conjunctiva/sclera: Conjunctivae normal.   Cardiovascular:      Rate and Rhythm: Normal rate and regular rhythm.      Pulses: Normal pulses.      Heart sounds: Normal heart sounds.   Pulmonary:      Effort: Pulmonary effort is normal.      Breath sounds: Normal breath sounds.   Abdominal:      General: Bowel sounds are normal.      Palpations: Abdomen is soft.      Tenderness: There is abdominal tenderness in the right lower quadrant. There is no guarding or rebound.   Musculoskeletal:         General: Normal range of motion.      Cervical back: Normal range of motion and neck supple.      Thoracic back: Tenderness present. No swelling or deformity.      Lumbar back: Tenderness present. No swelling or deformity.        Back:       Comments: Tenderness   Skin:     Findings: No rash.   Neurological:      General: No focal deficit present.      Mental Status: She is alert and oriented to person, place, and time.   Psychiatric:         Mood and Affect: Mood normal.           Medical Decision Making & ED Course   Medical Decision Makin y.o. female who presents to the emergency room complaining of right-sided back pain as well as right upper quadrant  pain.  The patient was evaluated with labs, CT scan and EKG.  The EKG showed a normal sinus rhythm, heart rate 80, normal axis, no ST elevation.  The patient's labs showed elevated liver enzymes.  This was similar to previous values.  Urinalysis showed ketones but was not consistent with UTI.  The patient was given a liter of fluid.  She was encouraged to increase her fluid intake.  CT scan showed hepatic steatosis and no other acute findings.  The patient was appropriate for discharge and outpatient follow-up.  ----      Differential diagnoses considered include but are not limited to: Gastritis, gastroenteritis, GERD, food poisoning, ileus, bowel obstruction, hernia, acute appendicitis, cholecystitis, diverticulitis, colitis, renal colic, musculoskeletal back pain with radiation to the abdomen.    Social Determinants of Health which Significantly Impact Care: Social Determinants of Health which Significantly Impact Care: None identified     EKG Independent Interpretation: EKG interpreted by myself. Please see ED Course for full interpretation.    Independent Result Review and Interpretation: Relevant laboratory and radiographic results were reviewed and independently interpreted by myself.  As necessary, they are commented on in the ED Course.    Chronic conditions affecting the patient's care: As documented above in [facility]    The patient was discussed with the following consultants/services: None    Care Considerations: As documented above in [facility]    ED Course:  Diagnoses as of 05/29/25 1943   Right upper quadrant abdominal pain       Disposition   As a result of the work-up, the patient was discharged home.  she was informed of her diagnosis and instructed to come back with any concerns or worsening of condition.  she and was agreeable to the plan as discussed above.  she was given the opportunity to ask questions.  All of the patient's questions were answered.    Procedures   Procedures        Ricky Thomas,  MD  Emergency Medicine                                                            Ricky Thomas MD  05/29/25 1948

## 2025-05-29 NOTE — ED NOTES
Community Resource Name: No primary care physician.  Phone Number:   Staff Member: Ibis Edward     Discussed the following topics on behalf of the patient:  []  Behavioral Health Assistance     []  Case Management  []   Assistance  []  Digital Equity Assistance  []  Dental Health Assistance  []  Education Assistance  []  Employment Assistance  []  Financial Strain Relief Assistance  []  Food Insecurity Assistance  [x]  Healthcare Coverage Assistance  []  Housing Stability Assistance  []  IP Violence Relief Assistance  []  Legal Assistance  []  Physical Activity Assistance  []  Social Connection Assistance  []  Stress Relief Assistance   []  Substance Abuse Assistance  []  Transportation Assistance  []  Utility Assistance  [x]  Other: [insert comment here]  Patient does have a PCP. CHW updated the patient chart.  Next Steps:         ELIE Diallo  Track patients for community healthcare services. CHW talked to patient regarding not having a primary care physician. Patient expressed that she does have a primary care physician , named Dr. Angel Luis Swanson [facility]. CHW updated the patient chart with the physician name added. Patient expressed appreciation.        ELIE Diallo  05/29/25 [id number]

## 2025-06-02 LAB
ATRIAL RATE: 80 BPM
P AXIS: 73 DEGREES
P OFFSET: 196 MS
P ONSET: 149 MS
PR INTERVAL: 152 MS
Q ONSET: 225 MS
QRS COUNT: 13 BEATS
QRS DURATION: 80 MS
QT INTERVAL: 390 MS
QTC CALCULATION(BAZETT): 449 MS
QTC FREDERICIA: 429 MS
R AXIS: 30 DEGREES
T AXIS: 66 DEGREES
T OFFSET: 420 MS
VENTRICULAR RATE: 80 BPM

## 2025-06-12 LAB
ATRIAL RATE: 80 BPM
P AXIS: 73 DEGREES
P OFFSET: 196 MS
P ONSET: 149 MS
PR INTERVAL: 152 MS
Q ONSET: 225 MS
QRS COUNT: 13 BEATS
QRS DURATION: 80 MS
QT INTERVAL: 390 MS
QTC CALCULATION(BAZETT): 449 MS
QTC FREDERICIA: 429 MS
R AXIS: 30 DEGREES
T AXIS: 66 DEGREES
T OFFSET: 420 MS
VENTRICULAR RATE: 80 BPM

## (undated) DEVICE — RETRIEVAL SYSTEM, MONARCH, 10MM DISP ENDOSCOPIC

## (undated) DEVICE — GLOVE, SURGICAL, PROTEXIS PI MICRO, 7.5, PF, LF

## (undated) DEVICE — HOLSTER, JET SAFETY

## (undated) DEVICE — ADHESIVE, SKIN, LIQUIBAND EXCEED

## (undated) DEVICE — TOWEL, SURGICAL, NEURO, O/R, 16 X 26, BLUE, STERILE

## (undated) DEVICE — SYRINGE, 20 CC, LUER LOCK

## (undated) DEVICE — Device

## (undated) DEVICE — SYRINGE, 20 CC, LUER LOCK, MONOJECT, W/O CAP, LF

## (undated) DEVICE — SHEARS, CURVED 5MM, ENDOPATH

## (undated) DEVICE — DRAPE, C-ARM, W/12 IN COVER, LI XTRAY TUBE

## (undated) DEVICE — TROCAR SYSTEM, BALLOON, KII GELPORT, 12 X 100MM

## (undated) DEVICE — GLOVE, SURGICAL, PROTEXIS PI ORTHO, 7.0, PF, LF

## (undated) DEVICE — SUTURE, MONOCRYL, 4-0, 27 IN, PS-2, UNDYED

## (undated) DEVICE — CANNULA, KII ADVANCED FIXATION, 5X100MM W/SEAL

## (undated) DEVICE — SUTURE, VICRYL, 0, 27 IN, UR-6, VIOLET

## (undated) DEVICE — CLIP, LIGATING, HEM-O-LOCK, MEDIUM/LARGE, LF, GREEN

## (undated) DEVICE — CATHETER, CHOLANGIOGRAM, 18 G X 11

## (undated) DEVICE — SUTURE, VICRYL PLUS, 0, 27IN, UR-6, VIOLET, BRAIDED

## (undated) DEVICE — CLIP, ENDO, CLINCH II, W/RATCHET, ON/OFF, CLINCH II, 5 MM

## (undated) DEVICE — SCOPE WARMER, LAPAROSCOPE, BAG ONLY, LF

## (undated) DEVICE — TUBE SET, PNEUMOLAR HEATED, SMOKE EVACU, HIGH-FLOW

## (undated) DEVICE — SUTURE, VICRYL, 0, 27 IN, UR-5, VIOLET

## (undated) DEVICE — PUMP, STRYKERFLOW 2 & HANDPIECE W/10FT. IRRIGATION TUBING

## (undated) DEVICE — SYSTEM, TROCAR LAP, 5X100MM, SHIELD BLADED, KII ADVANCED FIX ABDOMINAL

## (undated) DEVICE — CATHETER, IV, ANGIOCATH, 14 G X 5.25 IN, FEP POLYMER

## (undated) DEVICE — SUTURE, MONOCRYL, 4-0, 18 IN, PS2, UNDYED